# Patient Record
Sex: FEMALE | Race: OTHER | HISPANIC OR LATINO
[De-identification: names, ages, dates, MRNs, and addresses within clinical notes are randomized per-mention and may not be internally consistent; named-entity substitution may affect disease eponyms.]

---

## 2017-02-02 ENCOUNTER — RESULT REVIEW (OUTPATIENT)
Age: 22
End: 2017-02-02

## 2017-04-05 ENCOUNTER — OUTPATIENT (OUTPATIENT)
Dept: OUTPATIENT SERVICES | Facility: HOSPITAL | Age: 22
LOS: 1 days | Discharge: HOME | End: 2017-04-05
Payer: SELF-PAY

## 2017-04-05 DIAGNOSIS — R76.11 NONSPECIFIC REACTION TO TUBERCULIN SKIN TEST WITHOUT ACTIVE TUBERCULOSIS: ICD-10-CM

## 2017-04-05 PROCEDURE — 71020: CPT | Mod: 26

## 2017-07-12 ENCOUNTER — RESULT REVIEW (OUTPATIENT)
Age: 22
End: 2017-07-12

## 2018-02-20 ENCOUNTER — EMERGENCY (EMERGENCY)
Facility: HOSPITAL | Age: 23
LOS: 0 days | Discharge: HOME | End: 2018-02-20
Attending: EMERGENCY MEDICINE | Admitting: EMERGENCY MEDICINE
Payer: SELF-PAY

## 2018-02-20 VITALS
DIASTOLIC BLOOD PRESSURE: 75 MMHG | RESPIRATION RATE: 16 BRPM | HEART RATE: 66 BPM | OXYGEN SATURATION: 100 % | TEMPERATURE: 98 F | SYSTOLIC BLOOD PRESSURE: 120 MMHG

## 2018-02-20 VITALS
HEART RATE: 66 BPM | TEMPERATURE: 98 F | OXYGEN SATURATION: 100 % | RESPIRATION RATE: 16 BRPM | SYSTOLIC BLOOD PRESSURE: 118 MMHG | DIASTOLIC BLOOD PRESSURE: 71 MMHG

## 2018-02-20 DIAGNOSIS — R51 HEADACHE: ICD-10-CM

## 2018-02-20 DIAGNOSIS — G93.89 OTHER SPECIFIED DISORDERS OF BRAIN: ICD-10-CM

## 2018-02-20 DIAGNOSIS — R11.0 NAUSEA: ICD-10-CM

## 2018-02-20 DIAGNOSIS — R10.13 EPIGASTRIC PAIN: ICD-10-CM

## 2018-02-20 LAB
ALBUMIN SERPL ELPH-MCNC: 4.1 G/DL — SIGNIFICANT CHANGE UP (ref 3.3–5)
ALP SERPL-CCNC: 90 U/L — SIGNIFICANT CHANGE UP (ref 40–120)
ALT FLD-CCNC: 19 U/L — SIGNIFICANT CHANGE UP (ref 12–78)
ANION GAP SERPL CALC-SCNC: 6 MMOL/L — SIGNIFICANT CHANGE UP (ref 5–17)
APPEARANCE UR: CLEAR — SIGNIFICANT CHANGE UP
AST SERPL-CCNC: 14 U/L — LOW (ref 15–37)
BACTERIA # UR AUTO: (no result)
BASOPHILS # BLD AUTO: 0.1 K/UL — SIGNIFICANT CHANGE UP (ref 0–0.2)
BASOPHILS NFR BLD AUTO: 0.7 % — SIGNIFICANT CHANGE UP (ref 0–2)
BILIRUB SERPL-MCNC: 0.5 MG/DL — SIGNIFICANT CHANGE UP (ref 0.2–1.2)
BILIRUB UR-MCNC: NEGATIVE — SIGNIFICANT CHANGE UP
BUN SERPL-MCNC: 6 MG/DL — LOW (ref 7–23)
CALCIUM SERPL-MCNC: 8.8 MG/DL — SIGNIFICANT CHANGE UP (ref 8.5–10.1)
CHLORIDE SERPL-SCNC: 110 MMOL/L — HIGH (ref 96–108)
CO2 SERPL-SCNC: 25 MMOL/L — SIGNIFICANT CHANGE UP (ref 22–31)
COLOR SPEC: YELLOW — SIGNIFICANT CHANGE UP
CREAT SERPL-MCNC: 0.7 MG/DL — SIGNIFICANT CHANGE UP (ref 0.5–1.3)
DIFF PNL FLD: (no result)
EOSINOPHIL # BLD AUTO: 0.1 K/UL — SIGNIFICANT CHANGE UP (ref 0–0.5)
EOSINOPHIL NFR BLD AUTO: 0.7 % — SIGNIFICANT CHANGE UP (ref 0–6)
EPI CELLS # UR: (no result)
GLUCOSE SERPL-MCNC: 80 MG/DL — SIGNIFICANT CHANGE UP (ref 70–99)
GLUCOSE UR QL: NEGATIVE MG/DL — SIGNIFICANT CHANGE UP
HCG SERPL-ACNC: <1 MIU/ML — SIGNIFICANT CHANGE UP
HCT VFR BLD CALC: 40.7 % — SIGNIFICANT CHANGE UP (ref 34.5–45)
HGB BLD-MCNC: 14.3 G/DL — SIGNIFICANT CHANGE UP (ref 11.5–15.5)
KETONES UR-MCNC: NEGATIVE — SIGNIFICANT CHANGE UP
LEUKOCYTE ESTERASE UR-ACNC: NEGATIVE — SIGNIFICANT CHANGE UP
LYMPHOCYTES # BLD AUTO: 2.8 K/UL — SIGNIFICANT CHANGE UP (ref 1–3.3)
LYMPHOCYTES # BLD AUTO: 23.4 % — SIGNIFICANT CHANGE UP (ref 13–44)
MCHC RBC-ENTMCNC: 29.9 PG — SIGNIFICANT CHANGE UP (ref 27–34)
MCHC RBC-ENTMCNC: 35.2 GM/DL — SIGNIFICANT CHANGE UP (ref 32–36)
MCV RBC AUTO: 85 FL — SIGNIFICANT CHANGE UP (ref 80–100)
MONOCYTES # BLD AUTO: 0.7 K/UL — SIGNIFICANT CHANGE UP (ref 0–0.9)
MONOCYTES NFR BLD AUTO: 5.9 % — SIGNIFICANT CHANGE UP (ref 2–14)
NEUTROPHILS # BLD AUTO: 8.3 K/UL — HIGH (ref 1.8–7.4)
NEUTROPHILS NFR BLD AUTO: 69.2 % — SIGNIFICANT CHANGE UP (ref 43–77)
NITRITE UR-MCNC: NEGATIVE — SIGNIFICANT CHANGE UP
PH UR: 6.5 — SIGNIFICANT CHANGE UP (ref 5–8)
PLATELET # BLD AUTO: 260 K/UL — SIGNIFICANT CHANGE UP (ref 150–400)
POTASSIUM SERPL-MCNC: 3.6 MMOL/L — SIGNIFICANT CHANGE UP (ref 3.5–5.3)
POTASSIUM SERPL-SCNC: 3.6 MMOL/L — SIGNIFICANT CHANGE UP (ref 3.5–5.3)
PROT SERPL-MCNC: 7.4 GM/DL — SIGNIFICANT CHANGE UP (ref 6–8.3)
PROT UR-MCNC: NEGATIVE MG/DL — SIGNIFICANT CHANGE UP
RBC # BLD: 4.78 M/UL — SIGNIFICANT CHANGE UP (ref 3.8–5.2)
RBC # FLD: 10.7 % — SIGNIFICANT CHANGE UP (ref 10.3–14.5)
RBC CASTS # UR COMP ASSIST: (no result) /HPF (ref 0–4)
SODIUM SERPL-SCNC: 141 MMOL/L — SIGNIFICANT CHANGE UP (ref 135–145)
SP GR SPEC: 1 — LOW (ref 1.01–1.02)
UROBILINOGEN FLD QL: NEGATIVE MG/DL — SIGNIFICANT CHANGE UP
WBC # BLD: 12 K/UL — HIGH (ref 3.8–10.5)
WBC # FLD AUTO: 12 K/UL — HIGH (ref 3.8–10.5)
WBC UR QL: SIGNIFICANT CHANGE UP

## 2018-02-20 PROCEDURE — 70450 CT HEAD/BRAIN W/O DYE: CPT | Mod: 26

## 2018-02-20 PROCEDURE — 99284 EMERGENCY DEPT VISIT MOD MDM: CPT

## 2018-02-20 RX ORDER — IBUPROFEN 200 MG
1 TABLET ORAL
Qty: 20 | Refills: 0
Start: 2018-02-20

## 2018-02-20 RX ORDER — ACETAMINOPHEN 500 MG
650 TABLET ORAL ONCE
Qty: 0 | Refills: 0 | Status: COMPLETED | OUTPATIENT
Start: 2018-02-20 | End: 2018-02-20

## 2018-02-20 RX ORDER — ACETAMINOPHEN 500 MG
3 TABLET ORAL
Qty: 40 | Refills: 0
Start: 2018-02-20

## 2018-02-20 RX ORDER — SODIUM CHLORIDE 9 MG/ML
1000 INJECTION INTRAMUSCULAR; INTRAVENOUS; SUBCUTANEOUS ONCE
Qty: 0 | Refills: 0 | Status: COMPLETED | OUTPATIENT
Start: 2018-02-20 | End: 2018-02-20

## 2018-02-20 RX ORDER — METOCLOPRAMIDE HCL 10 MG
10 TABLET ORAL ONCE
Qty: 0 | Refills: 0 | Status: COMPLETED | OUTPATIENT
Start: 2018-02-20 | End: 2018-02-20

## 2018-02-20 RX ADMIN — Medication 650 MILLIGRAM(S): at 15:53

## 2018-02-20 RX ADMIN — Medication 10 MILLIGRAM(S): at 15:53

## 2018-02-20 RX ADMIN — SODIUM CHLORIDE 1000 MILLILITER(S): 9 INJECTION INTRAMUSCULAR; INTRAVENOUS; SUBCUTANEOUS at 15:54

## 2018-02-20 NOTE — ED PROVIDER NOTE - MEDICAL DECISION MAKING DETAILS
23 y/o F c/o gradual onset HA, nausea, dizziness, eye pain over the past x2 days with plans to receive labs, CBC, CT imaging

## 2018-02-20 NOTE — ED PROVIDER NOTE - OBJECTIVE STATEMENT
21 y/o F with no pertinent PMHx presents to the ED c/o worsening HA, L sided neck pain, nausea for the past x2 days. Pt states that she has been experiencing blurry vision as well as epigastric pain. Pt currently calm, able to provide adequate hx and denies fever, cough, chills, vomiting, dir britt or any other acute c/o at this time. Pt states that the pain came on gradually, had x1 pregnancy in past, no sick contacts. Pt states that she went x2 months without period, took home test which was negative. Pt states that she has been experiencing bilat eye pain.

## 2018-02-20 NOTE — ED ADULT NURSE NOTE - CHIEF COMPLAINT QUOTE
frontal headache x 2 days denies fever, denies nausea or vomiting, blurry vision x 2 days, tingling in bilateral hands, last menstrual period 2 months ago, states pregnancy test was negative.  reports epigastric pain for 2 months,  did not take any medications for headache. denies h/o headaches

## 2018-02-20 NOTE — ED ADULT TRIAGE NOTE - CHIEF COMPLAINT QUOTE
frontal headache x 2 days denies fever, denies nausea or vomiting, blurry vision x 2 days, tingling in bilateral hands, last menstrual period 2 months ago, states pregnancy test was negative.  reports epigastric pain for 2 months frontal headache x 2 days denies fever, denies nausea or vomiting, blurry vision x 2 days, tingling in bilateral hands, last menstrual period 2 months ago, states pregnancy test was negative.  reports epigastric pain for 2 months,  did not take any medications for headache. denies h/o headaches

## 2018-02-21 LAB
CULTURE RESULTS: NO GROWTH — SIGNIFICANT CHANGE UP
SPECIMEN SOURCE: SIGNIFICANT CHANGE UP

## 2018-10-03 ENCOUNTER — RESULT REVIEW (OUTPATIENT)
Age: 23
End: 2018-10-03

## 2018-10-28 ENCOUNTER — EMERGENCY (EMERGENCY)
Facility: HOSPITAL | Age: 23
LOS: 0 days | Discharge: HOME | End: 2018-10-28
Attending: EMERGENCY MEDICINE | Admitting: EMERGENCY MEDICINE
Payer: MEDICAID

## 2018-10-28 VITALS
SYSTOLIC BLOOD PRESSURE: 109 MMHG | HEART RATE: 65 BPM | DIASTOLIC BLOOD PRESSURE: 64 MMHG | RESPIRATION RATE: 16 BRPM | TEMPERATURE: 99 F | OXYGEN SATURATION: 100 %

## 2018-10-28 VITALS — HEIGHT: 64.96 IN | WEIGHT: 125 LBS

## 2018-10-28 DIAGNOSIS — N39.0 URINARY TRACT INFECTION, SITE NOT SPECIFIED: ICD-10-CM

## 2018-10-28 DIAGNOSIS — R10.9 UNSPECIFIED ABDOMINAL PAIN: ICD-10-CM

## 2018-10-28 DIAGNOSIS — K59.00 CONSTIPATION, UNSPECIFIED: ICD-10-CM

## 2018-10-28 LAB
ALBUMIN SERPL ELPH-MCNC: 4.2 G/DL — SIGNIFICANT CHANGE UP (ref 3.3–5)
ALP SERPL-CCNC: 85 U/L — SIGNIFICANT CHANGE UP (ref 40–120)
ALT FLD-CCNC: 21 U/L — SIGNIFICANT CHANGE UP (ref 12–78)
ANION GAP SERPL CALC-SCNC: 6 MMOL/L — SIGNIFICANT CHANGE UP (ref 5–17)
APPEARANCE UR: ABNORMAL
AST SERPL-CCNC: 14 U/L — LOW (ref 15–37)
BACTERIA # UR AUTO: ABNORMAL
BASOPHILS # BLD AUTO: 0.03 K/UL — SIGNIFICANT CHANGE UP (ref 0–0.2)
BASOPHILS NFR BLD AUTO: 0.3 % — SIGNIFICANT CHANGE UP (ref 0–2)
BILIRUB SERPL-MCNC: 0.4 MG/DL — SIGNIFICANT CHANGE UP (ref 0.2–1.2)
BILIRUB UR-MCNC: NEGATIVE — SIGNIFICANT CHANGE UP
BUN SERPL-MCNC: 6 MG/DL — LOW (ref 7–23)
CALCIUM SERPL-MCNC: 9.2 MG/DL — SIGNIFICANT CHANGE UP (ref 8.5–10.1)
CHLORIDE SERPL-SCNC: 108 MMOL/L — SIGNIFICANT CHANGE UP (ref 96–108)
CO2 SERPL-SCNC: 28 MMOL/L — SIGNIFICANT CHANGE UP (ref 22–31)
COLOR SPEC: YELLOW — SIGNIFICANT CHANGE UP
CREAT SERPL-MCNC: 0.71 MG/DL — SIGNIFICANT CHANGE UP (ref 0.5–1.3)
DIFF PNL FLD: ABNORMAL
EOSINOPHIL # BLD AUTO: 0.09 K/UL — SIGNIFICANT CHANGE UP (ref 0–0.5)
EOSINOPHIL NFR BLD AUTO: 0.8 % — SIGNIFICANT CHANGE UP (ref 0–6)
EPI CELLS # UR: SIGNIFICANT CHANGE UP
GLUCOSE SERPL-MCNC: 88 MG/DL — SIGNIFICANT CHANGE UP (ref 70–99)
GLUCOSE UR QL: NEGATIVE MG/DL — SIGNIFICANT CHANGE UP
HCT VFR BLD CALC: 41.4 % — SIGNIFICANT CHANGE UP (ref 34.5–45)
HGB BLD-MCNC: 14.5 G/DL — SIGNIFICANT CHANGE UP (ref 11.5–15.5)
IMM GRANULOCYTES NFR BLD AUTO: 0.3 % — SIGNIFICANT CHANGE UP (ref 0–1.5)
INR BLD: 1.19 RATIO — HIGH (ref 0.88–1.16)
KETONES UR-MCNC: NEGATIVE — SIGNIFICANT CHANGE UP
LEUKOCYTE ESTERASE UR-ACNC: ABNORMAL
LYMPHOCYTES # BLD AUTO: 3.59 K/UL — HIGH (ref 1–3.3)
LYMPHOCYTES # BLD AUTO: 33.8 % — SIGNIFICANT CHANGE UP (ref 13–44)
MCHC RBC-ENTMCNC: 30.1 PG — SIGNIFICANT CHANGE UP (ref 27–34)
MCHC RBC-ENTMCNC: 35 GM/DL — SIGNIFICANT CHANGE UP (ref 32–36)
MCV RBC AUTO: 85.9 FL — SIGNIFICANT CHANGE UP (ref 80–100)
MONOCYTES # BLD AUTO: 0.51 K/UL — SIGNIFICANT CHANGE UP (ref 0–0.9)
MONOCYTES NFR BLD AUTO: 4.8 % — SIGNIFICANT CHANGE UP (ref 2–14)
NEUTROPHILS # BLD AUTO: 6.36 K/UL — SIGNIFICANT CHANGE UP (ref 1.8–7.4)
NEUTROPHILS NFR BLD AUTO: 60 % — SIGNIFICANT CHANGE UP (ref 43–77)
NITRITE UR-MCNC: NEGATIVE — SIGNIFICANT CHANGE UP
NRBC # BLD: 0 /100 WBCS — SIGNIFICANT CHANGE UP (ref 0–0)
PH UR: 6 — SIGNIFICANT CHANGE UP (ref 5–8)
PLATELET # BLD AUTO: 351 K/UL — SIGNIFICANT CHANGE UP (ref 150–400)
POTASSIUM SERPL-MCNC: 3.6 MMOL/L — SIGNIFICANT CHANGE UP (ref 3.5–5.3)
POTASSIUM SERPL-SCNC: 3.6 MMOL/L — SIGNIFICANT CHANGE UP (ref 3.5–5.3)
PROT SERPL-MCNC: 7.8 GM/DL — SIGNIFICANT CHANGE UP (ref 6–8.3)
PROT UR-MCNC: 15 MG/DL
PROTHROM AB SERPL-ACNC: 12.9 SEC — HIGH (ref 9.8–12.7)
RBC # BLD: 4.82 M/UL — SIGNIFICANT CHANGE UP (ref 3.8–5.2)
RBC # FLD: 11.6 % — SIGNIFICANT CHANGE UP (ref 10.3–14.5)
RBC CASTS # UR COMP ASSIST: ABNORMAL /HPF (ref 0–4)
SODIUM SERPL-SCNC: 142 MMOL/L — SIGNIFICANT CHANGE UP (ref 135–145)
SP GR SPEC: 1.01 — SIGNIFICANT CHANGE UP (ref 1.01–1.02)
UROBILINOGEN FLD QL: NEGATIVE MG/DL — SIGNIFICANT CHANGE UP
WBC # BLD: 10.61 K/UL — HIGH (ref 3.8–10.5)
WBC # FLD AUTO: 10.61 K/UL — HIGH (ref 3.8–10.5)
WBC UR QL: ABNORMAL

## 2018-10-28 PROCEDURE — 99284 EMERGENCY DEPT VISIT MOD MDM: CPT

## 2018-10-28 PROCEDURE — 74177 CT ABD & PELVIS W/CONTRAST: CPT | Mod: 26

## 2018-10-28 RX ORDER — CEPHALEXIN 500 MG
1 CAPSULE ORAL
Qty: 5 | Refills: 0
Start: 2018-10-28 | End: 2018-11-01

## 2018-10-28 RX ORDER — ACETAMINOPHEN 500 MG
1000 TABLET ORAL ONCE
Qty: 0 | Refills: 0 | Status: COMPLETED | OUTPATIENT
Start: 2018-10-28 | End: 2018-10-28

## 2018-10-28 RX ORDER — CEFTRIAXONE 500 MG/1
1000 INJECTION, POWDER, FOR SOLUTION INTRAMUSCULAR; INTRAVENOUS ONCE
Qty: 0 | Refills: 0 | Status: COMPLETED | OUTPATIENT
Start: 2018-10-28 | End: 2018-10-28

## 2018-10-28 RX ORDER — DIPHENHYDRAMINE HCL 50 MG
25 CAPSULE ORAL ONCE
Qty: 0 | Refills: 0 | Status: COMPLETED | OUTPATIENT
Start: 2018-10-28 | End: 2018-10-28

## 2018-10-28 RX ADMIN — Medication 400 MILLIGRAM(S): at 20:33

## 2018-10-28 RX ADMIN — Medication 25 MILLIGRAM(S): at 21:58

## 2018-10-28 RX ADMIN — CEFTRIAXONE 1000 MILLIGRAM(S): 500 INJECTION, POWDER, FOR SOLUTION INTRAMUSCULAR; INTRAVENOUS at 22:38

## 2018-10-28 NOTE — ED ADULT TRIAGE NOTE - CHIEF COMPLAINT QUOTE
c/o generalized abdominal pain started 3 days ago, pt c/o constipation, has not taken medication for symptoms, denies nausea/vomiting/diarrhea

## 2018-10-28 NOTE — ED STATDOCS - MEDICAL DECISION MAKING DETAILS
Pt with UTI.  Given rocephin, IVF in ED.  Appears well on reexam.  D/c home with antibiotics.  F/u with PCP in 1 week.

## 2018-10-28 NOTE — ED STATDOCS - OBJECTIVE STATEMENT
22 y/o f presenting to the ED c/o constipation, abd pain x3 days. Denies n/v/d, fever, chills. No medications taken PTA. Pain worse with eating. Last menstrual period 9/28/18. PCP: Misbah Jett.

## 2018-10-28 NOTE — ED STATDOCS - PROGRESS NOTE DETAILS
Patient is feeling much better, tests/labs reviewed. case discussed with attending. OK to dc home. STEPHEN Davis

## 2018-10-28 NOTE — ED ADULT NURSE NOTE - OBJECTIVE STATEMENT
Patient presents to ED for abdominal pain since 3days ago. Patient denies any urinary symptoms. States BM are irregular.

## 2018-10-28 NOTE — ED STATDOCS - PHYSICAL EXAMINATION
GEN: AOX3, NAD. HEENT: Throat clear. Head NC/AT. NECK: Supple, No JVD. FROM. C-spine non-tender. CV:S1S2, RRR, LUNGS: CTA b/l, no w/r/r. ABD: Soft, NT/ND, no rebound, no guarding. No CVAT. EXT: No e/c/c. 2+ distal pulses. SKIN: No rashes. NEURO: No focal deficits. CN II-XII intact. FROM. 5/5 motor and sensory. STEPHEN Davis

## 2019-01-02 ENCOUNTER — EMERGENCY (EMERGENCY)
Facility: HOSPITAL | Age: 24
LOS: 1 days | Discharge: HOME | End: 2019-01-02
Attending: EMERGENCY MEDICINE | Admitting: EMERGENCY MEDICINE
Payer: MEDICAID

## 2019-01-02 VITALS
RESPIRATION RATE: 19 BRPM | OXYGEN SATURATION: 100 % | TEMPERATURE: 98 F | SYSTOLIC BLOOD PRESSURE: 111 MMHG | HEART RATE: 73 BPM | DIASTOLIC BLOOD PRESSURE: 72 MMHG

## 2019-01-02 VITALS — WEIGHT: 139.99 LBS | HEIGHT: 65 IN

## 2019-01-02 DIAGNOSIS — R33.0 DRUG INDUCED RETENTION OF URINE: ICD-10-CM

## 2019-01-02 DIAGNOSIS — R51 HEADACHE: ICD-10-CM

## 2019-01-02 DIAGNOSIS — M54.9 DORSALGIA, UNSPECIFIED: ICD-10-CM

## 2019-01-02 DIAGNOSIS — M54.5 LOW BACK PAIN: ICD-10-CM

## 2019-01-02 LAB
APPEARANCE UR: CLEAR — SIGNIFICANT CHANGE UP
BACTERIA # UR AUTO: ABNORMAL
BILIRUB UR-MCNC: NEGATIVE — SIGNIFICANT CHANGE UP
COLOR SPEC: YELLOW — SIGNIFICANT CHANGE UP
DIFF PNL FLD: ABNORMAL
EPI CELLS # UR: SIGNIFICANT CHANGE UP
GLUCOSE UR QL: NEGATIVE MG/DL — SIGNIFICANT CHANGE UP
KETONES UR-MCNC: ABNORMAL
LEUKOCYTE ESTERASE UR-ACNC: NEGATIVE — SIGNIFICANT CHANGE UP
NITRITE UR-MCNC: NEGATIVE — SIGNIFICANT CHANGE UP
PH UR: 8 — SIGNIFICANT CHANGE UP (ref 5–8)
PROT UR-MCNC: NEGATIVE MG/DL — SIGNIFICANT CHANGE UP
RBC CASTS # UR COMP ASSIST: ABNORMAL /HPF (ref 0–4)
SP GR SPEC: 1.01 — SIGNIFICANT CHANGE UP (ref 1.01–1.02)
UROBILINOGEN FLD QL: NEGATIVE MG/DL — SIGNIFICANT CHANGE UP
WBC UR QL: NEGATIVE — SIGNIFICANT CHANGE UP

## 2019-01-02 PROCEDURE — 99283 EMERGENCY DEPT VISIT LOW MDM: CPT

## 2019-01-02 RX ORDER — KETOROLAC TROMETHAMINE 30 MG/ML
30 SYRINGE (ML) INJECTION ONCE
Qty: 0 | Refills: 0 | Status: DISCONTINUED | OUTPATIENT
Start: 2019-01-02 | End: 2019-01-02

## 2019-01-02 RX ADMIN — Medication 30 MILLIGRAM(S): at 22:19

## 2019-01-02 NOTE — ED STATDOCS - ATTENDING CONTRIBUTION TO CARE
Attending Contribution to Care: I, Stephanie Villarreal, performed the initial face to face bedside interview with this patient regarding history of present illness, review of symptoms and relevant past medical, social and family history.  I completed an independent physical examination.  I was the initial provider who evaluated this patient and the history, physical, and MDM reflect this intial assessment. I have signed out the follow up of any pending tests after the original (i.e. labs, radiological studies) to the ACP with instructions to review any with instructions to review any concerning findings to me prior to discharge.  I have communicated the patient’s plan of care and disposition with the ACP.

## 2019-01-02 NOTE — ED STATDOCS - PROGRESS NOTE DETAILS
pr c/o right lower back pain works lifting heavy item's  denies distal sensory loss Pt feeling better after medication, plan to d/c home with outpt f/u with PMD, pt agreeable to d/c and plan of care, all questions answered, return precautions given  Avis Tamayo PA-C

## 2019-01-02 NOTE — ED STATDOCS - CARE PLAN
Principal Discharge DX:	Acute bilateral low back pain without sciatica  Goal:	evaluate and discharge

## 2019-01-02 NOTE — ED ADULT NURSE NOTE - NSIMPLEMENTINTERV_GEN_ALL_ED
Implemented All Universal Safety Interventions:  [place] to call system. Call bell, personal items and telephone within reach. Instruct patient to call for assistance. Room bathroom lighting operational. Non-slip footwear when patient is off stretcher. Physically safe environment: no spills, clutter or unnecessary equipment. Stretcher in lowest position, wheels locked, appropriate side rails in place.

## 2019-01-02 NOTE — ED ADULT NURSE REASSESSMENT NOTE - NS ED NURSE REASSESS COMMENT FT1
patient discharged home. written and verbal discharge and followup instructions given to patient, patient verbalized back understanding. discharged home with family at 2316.

## 2019-01-02 NOTE — ED STATDOCS - OBJECTIVE STATEMENT
24 y/o female with no pertinent PMHx presenting to the ED c/o headache and back pain x2 weeks. +pain with urination. Taking Ibuprofen with no relief. LNMP 11/13.

## 2019-01-02 NOTE — ED ADULT TRIAGE NOTE - CHIEF COMPLAINT QUOTE
Pt presents to ER c/o back pain and headache. Onset of symptoms began 2 weeks ago. Pt states "I would like to have a pregnancy test". Neuro intact, gait stable

## 2019-01-08 ENCOUNTER — EMERGENCY (EMERGENCY)
Facility: HOSPITAL | Age: 24
LOS: 0 days | Discharge: HOME | End: 2019-01-09
Attending: EMERGENCY MEDICINE | Admitting: EMERGENCY MEDICINE
Payer: MEDICAID

## 2019-01-08 VITALS — WEIGHT: 145.06 LBS

## 2019-01-08 DIAGNOSIS — Z79.899 OTHER LONG TERM (CURRENT) DRUG THERAPY: ICD-10-CM

## 2019-01-08 DIAGNOSIS — Z98.890 OTHER SPECIFIED POSTPROCEDURAL STATES: ICD-10-CM

## 2019-01-08 DIAGNOSIS — N64.4 MASTODYNIA: ICD-10-CM

## 2019-01-08 DIAGNOSIS — K52.9 NONINFECTIVE GASTROENTERITIS AND COLITIS, UNSPECIFIED: ICD-10-CM

## 2019-01-08 DIAGNOSIS — R11.0 NAUSEA: ICD-10-CM

## 2019-01-08 DIAGNOSIS — R10.30 LOWER ABDOMINAL PAIN, UNSPECIFIED: ICD-10-CM

## 2019-01-08 LAB
ALBUMIN SERPL ELPH-MCNC: 3.9 G/DL — SIGNIFICANT CHANGE UP (ref 3.3–5)
ALP SERPL-CCNC: 87 U/L — SIGNIFICANT CHANGE UP (ref 40–120)
ALT FLD-CCNC: 24 U/L — SIGNIFICANT CHANGE UP (ref 12–78)
ANION GAP SERPL CALC-SCNC: 8 MMOL/L — SIGNIFICANT CHANGE UP (ref 5–17)
APPEARANCE UR: CLEAR — SIGNIFICANT CHANGE UP
AST SERPL-CCNC: 21 U/L — SIGNIFICANT CHANGE UP (ref 15–37)
BASOPHILS # BLD AUTO: 0.03 K/UL — SIGNIFICANT CHANGE UP (ref 0–0.2)
BASOPHILS NFR BLD AUTO: 0.3 % — SIGNIFICANT CHANGE UP (ref 0–2)
BILIRUB SERPL-MCNC: 0.3 MG/DL — SIGNIFICANT CHANGE UP (ref 0.2–1.2)
BILIRUB UR-MCNC: NEGATIVE — SIGNIFICANT CHANGE UP
BUN SERPL-MCNC: 7 MG/DL — SIGNIFICANT CHANGE UP (ref 7–23)
CALCIUM SERPL-MCNC: 9.1 MG/DL — SIGNIFICANT CHANGE UP (ref 8.5–10.1)
CHLORIDE SERPL-SCNC: 108 MMOL/L — SIGNIFICANT CHANGE UP (ref 96–108)
CO2 SERPL-SCNC: 25 MMOL/L — SIGNIFICANT CHANGE UP (ref 22–31)
COLOR SPEC: YELLOW — SIGNIFICANT CHANGE UP
CREAT SERPL-MCNC: 0.69 MG/DL — SIGNIFICANT CHANGE UP (ref 0.5–1.3)
DIFF PNL FLD: ABNORMAL
EOSINOPHIL # BLD AUTO: 0.28 K/UL — SIGNIFICANT CHANGE UP (ref 0–0.5)
EOSINOPHIL NFR BLD AUTO: 3.1 % — SIGNIFICANT CHANGE UP (ref 0–6)
GLUCOSE SERPL-MCNC: 90 MG/DL — SIGNIFICANT CHANGE UP (ref 70–99)
GLUCOSE UR QL: NEGATIVE MG/DL — SIGNIFICANT CHANGE UP
HCT VFR BLD CALC: 39.8 % — SIGNIFICANT CHANGE UP (ref 34.5–45)
HGB BLD-MCNC: 13.6 G/DL — SIGNIFICANT CHANGE UP (ref 11.5–15.5)
IMM GRANULOCYTES NFR BLD AUTO: 0.2 % — SIGNIFICANT CHANGE UP (ref 0–1.5)
KETONES UR-MCNC: NEGATIVE — SIGNIFICANT CHANGE UP
LEUKOCYTE ESTERASE UR-ACNC: NEGATIVE — SIGNIFICANT CHANGE UP
LIDOCAIN IGE QN: 145 U/L — SIGNIFICANT CHANGE UP (ref 73–393)
LYMPHOCYTES # BLD AUTO: 3.78 K/UL — HIGH (ref 1–3.3)
LYMPHOCYTES # BLD AUTO: 41.2 % — SIGNIFICANT CHANGE UP (ref 13–44)
MCHC RBC-ENTMCNC: 29.8 PG — SIGNIFICANT CHANGE UP (ref 27–34)
MCHC RBC-ENTMCNC: 34.2 GM/DL — SIGNIFICANT CHANGE UP (ref 32–36)
MCV RBC AUTO: 87.3 FL — SIGNIFICANT CHANGE UP (ref 80–100)
MONOCYTES # BLD AUTO: 0.66 K/UL — SIGNIFICANT CHANGE UP (ref 0–0.9)
MONOCYTES NFR BLD AUTO: 7.2 % — SIGNIFICANT CHANGE UP (ref 2–14)
NEUTROPHILS # BLD AUTO: 4.41 K/UL — SIGNIFICANT CHANGE UP (ref 1.8–7.4)
NEUTROPHILS NFR BLD AUTO: 48 % — SIGNIFICANT CHANGE UP (ref 43–77)
NITRITE UR-MCNC: NEGATIVE — SIGNIFICANT CHANGE UP
NRBC # BLD: 0 /100 WBCS — SIGNIFICANT CHANGE UP (ref 0–0)
PH UR: 6 — SIGNIFICANT CHANGE UP (ref 5–8)
PLATELET # BLD AUTO: 290 K/UL — SIGNIFICANT CHANGE UP (ref 150–400)
POTASSIUM SERPL-MCNC: 3.5 MMOL/L — SIGNIFICANT CHANGE UP (ref 3.5–5.3)
POTASSIUM SERPL-SCNC: 3.5 MMOL/L — SIGNIFICANT CHANGE UP (ref 3.5–5.3)
PROT SERPL-MCNC: 7.3 GM/DL — SIGNIFICANT CHANGE UP (ref 6–8.3)
PROT UR-MCNC: NEGATIVE MG/DL — SIGNIFICANT CHANGE UP
RBC # BLD: 4.56 M/UL — SIGNIFICANT CHANGE UP (ref 3.8–5.2)
RBC # FLD: 12.3 % — SIGNIFICANT CHANGE UP (ref 10.3–14.5)
RBC CASTS # UR COMP ASSIST: SIGNIFICANT CHANGE UP /HPF (ref 0–4)
SODIUM SERPL-SCNC: 141 MMOL/L — SIGNIFICANT CHANGE UP (ref 135–145)
SP GR SPEC: 1.01 — SIGNIFICANT CHANGE UP (ref 1.01–1.02)
UROBILINOGEN FLD QL: NEGATIVE MG/DL — SIGNIFICANT CHANGE UP
WBC # BLD: 9.18 K/UL — SIGNIFICANT CHANGE UP (ref 3.8–10.5)
WBC # FLD AUTO: 9.18 K/UL — SIGNIFICANT CHANGE UP (ref 3.8–10.5)
WBC UR QL: SIGNIFICANT CHANGE UP

## 2019-01-08 PROCEDURE — 99284 EMERGENCY DEPT VISIT MOD MDM: CPT

## 2019-01-08 PROCEDURE — 76856 US EXAM PELVIC COMPLETE: CPT | Mod: 26

## 2019-01-08 PROCEDURE — 74177 CT ABD & PELVIS W/CONTRAST: CPT | Mod: 26

## 2019-01-08 PROCEDURE — 76830 TRANSVAGINAL US NON-OB: CPT | Mod: 26

## 2019-01-08 RX ORDER — SODIUM CHLORIDE 9 MG/ML
1000 INJECTION INTRAMUSCULAR; INTRAVENOUS; SUBCUTANEOUS ONCE
Qty: 0 | Refills: 0 | Status: COMPLETED | OUTPATIENT
Start: 2019-01-08 | End: 2019-01-08

## 2019-01-08 RX ORDER — ONDANSETRON 8 MG/1
4 TABLET, FILM COATED ORAL ONCE
Qty: 0 | Refills: 0 | Status: COMPLETED | OUTPATIENT
Start: 2019-01-08 | End: 2019-01-08

## 2019-01-08 RX ORDER — MORPHINE SULFATE 50 MG/1
4 CAPSULE, EXTENDED RELEASE ORAL ONCE
Qty: 0 | Refills: 0 | Status: DISCONTINUED | OUTPATIENT
Start: 2019-01-08 | End: 2019-01-08

## 2019-01-08 RX ADMIN — MORPHINE SULFATE 4 MILLIGRAM(S): 50 CAPSULE, EXTENDED RELEASE ORAL at 19:22

## 2019-01-08 RX ADMIN — MORPHINE SULFATE 4 MILLIGRAM(S): 50 CAPSULE, EXTENDED RELEASE ORAL at 23:09

## 2019-01-08 RX ADMIN — SODIUM CHLORIDE 2000 MILLILITER(S): 9 INJECTION INTRAMUSCULAR; INTRAVENOUS; SUBCUTANEOUS at 19:22

## 2019-01-08 RX ADMIN — SODIUM CHLORIDE 1000 MILLILITER(S): 9 INJECTION INTRAMUSCULAR; INTRAVENOUS; SUBCUTANEOUS at 20:09

## 2019-01-08 RX ADMIN — Medication 1 TABLET(S): at 21:41

## 2019-01-08 RX ADMIN — ONDANSETRON 4 MILLIGRAM(S): 8 TABLET, FILM COATED ORAL at 19:22

## 2019-01-08 NOTE — ED STATDOCS - ATTENDING CONTRIBUTION TO CARE
I, Tj Keith MD,  performed the initial face to face bedside interview with this patient regarding history of present illness, review of symptoms and relevant past medical, social and family history.  I completed an independent physical examination.  I was the initial provider who evaluated this patient. I have signed out the follow up of any pending tests (i.e. labs, radiological studies) to the ACP.  I have communicated the patient’s plan of care and disposition with the ACP.  The history, relevant review of systems, past medical and surgical history, medical decision making, and physical examination was documented by the scribe in my presence and I attest to the accuracy of the documentation.

## 2019-01-08 NOTE — ED STATDOCS - OBJECTIVE STATEMENT
24 y/o female with a PMHx of s/p  presents to the ED c/o constant lower mid abd pain today. Pain is worse with walking, 8 out of 10 in severity. +nausea. No vomiting, diarrhea. Pt seen at a clinic and advised to go to the ED for US. Pt also notes breast pain x2 weeks. No + sick contact. LMP: in November, in December she bled a little bit, denies chance of pregnancy. [place]  used, ID#: [id number].

## 2019-01-08 NOTE — ED ADULT NURSE NOTE - NSIMPLEMENTINTERV_GEN_ALL_ED
Implemented All Universal Safety Interventions:  [place] to call system. Call bell, personal items and telephone within reach. Instruct patient to call for assistance. Room bathroom lighting operational. Non-slip footwear when patient is off stretcher. Physically safe environment: no spills, clutter or unnecessary equipment. Stretcher in lowest position, wheels locked, appropriate side rails in place.

## 2019-01-08 NOTE — ED ADULT TRIAGE NOTE - CHIEF COMPLAINT QUOTE
pt c/o abdominal pain   and breast pain with 1 day of vaginal bleeding. abdominal pain started o yesterday, no fever, nvd

## 2019-01-08 NOTE — ED STATDOCS - PROGRESS NOTE DETAILS
22 y/o female with a PMHx of s/p  presents to the ED c/o constant lower mid abd pain today. Pain is worse with walking, 8 out of 10 in severity. +nausea. No vomiting, diarrhea. Pt seen at a clinic and advised to go to the ED for US. Pt also notes breast pain x2 weeks. No + sick contact. LMP: in November, in December she bled a little bit, denies chance of pregnancy. [place]  used, ID#: [id number]. Patient seen and evaluated, ED attending note and orders reviewed, will continue with patient follow up and care. Will perform pelvic exam / breast exam. If no palpable masses or pain will order CT if  pain present will order US.   -Toan LORD, PA-C +palpable masses BL Breasts, outlined with surgical pen. + left adnexal tenderness. No palpable masses. US of BL breasts, Pelvic US ordered.   -Toan LORD PA-C    -Toan LORD, PA-C JG:  Received signout on this patient who was having breast pain and lower abd pain.  UCG negative.  Pelvic US negative.  CT showing jejunitis.  PA already sent augmentin to pharmacy for mastitis; outpatient US or mammogram advised.  Pt. also to f/u with GI for Jejunitis found on CT.  Augmentin will also be used to treat this as well.

## 2019-01-09 VITALS
OXYGEN SATURATION: 99 % | HEART RATE: 72 BPM | DIASTOLIC BLOOD PRESSURE: 70 MMHG | SYSTOLIC BLOOD PRESSURE: 110 MMHG | RESPIRATION RATE: 17 BRPM | TEMPERATURE: 98 F

## 2019-01-09 LAB
C TRACH RRNA SPEC QL NAA+PROBE: SIGNIFICANT CHANGE UP
N GONORRHOEA RRNA SPEC QL NAA+PROBE: SIGNIFICANT CHANGE UP
SPECIMEN SOURCE: SIGNIFICANT CHANGE UP

## 2019-01-10 DIAGNOSIS — Z98.891 HISTORY OF UTERINE SCAR FROM PREVIOUS SURGERY: Chronic | ICD-10-CM

## 2019-01-19 ENCOUNTER — EMERGENCY (EMERGENCY)
Facility: HOSPITAL | Age: 24
LOS: 0 days | Discharge: HOME | End: 2019-01-19
Attending: EMERGENCY MEDICINE | Admitting: EMERGENCY MEDICINE
Payer: MEDICAID

## 2019-01-19 VITALS
SYSTOLIC BLOOD PRESSURE: 109 MMHG | HEIGHT: 62.2 IN | TEMPERATURE: 99 F | RESPIRATION RATE: 16 BRPM | WEIGHT: 128.97 LBS | DIASTOLIC BLOOD PRESSURE: 61 MMHG | OXYGEN SATURATION: 100 % | HEART RATE: 76 BPM

## 2019-01-19 DIAGNOSIS — Z79.899 OTHER LONG TERM (CURRENT) DRUG THERAPY: ICD-10-CM

## 2019-01-19 DIAGNOSIS — R10.9 UNSPECIFIED ABDOMINAL PAIN: ICD-10-CM

## 2019-01-19 DIAGNOSIS — Z98.891 HISTORY OF UTERINE SCAR FROM PREVIOUS SURGERY: Chronic | ICD-10-CM

## 2019-01-19 DIAGNOSIS — N94.6 DYSMENORRHEA, UNSPECIFIED: ICD-10-CM

## 2019-01-19 LAB
ALBUMIN SERPL ELPH-MCNC: 3.8 G/DL — SIGNIFICANT CHANGE UP (ref 3.3–5)
ALP SERPL-CCNC: 87 U/L — SIGNIFICANT CHANGE UP (ref 40–120)
ALT FLD-CCNC: 21 U/L — SIGNIFICANT CHANGE UP (ref 12–78)
ANION GAP SERPL CALC-SCNC: 5 MMOL/L — SIGNIFICANT CHANGE UP (ref 5–17)
APPEARANCE UR: CLEAR — SIGNIFICANT CHANGE UP
APTT BLD: 32.7 SEC — SIGNIFICANT CHANGE UP (ref 27.5–36.3)
AST SERPL-CCNC: 17 U/L — SIGNIFICANT CHANGE UP (ref 15–37)
BACTERIA # UR AUTO: ABNORMAL
BASOPHILS # BLD AUTO: 0.02 K/UL — SIGNIFICANT CHANGE UP (ref 0–0.2)
BASOPHILS NFR BLD AUTO: 0.3 % — SIGNIFICANT CHANGE UP (ref 0–2)
BILIRUB SERPL-MCNC: 0.3 MG/DL — SIGNIFICANT CHANGE UP (ref 0.2–1.2)
BILIRUB UR-MCNC: NEGATIVE — SIGNIFICANT CHANGE UP
BLD GP AB SCN SERPL QL: SIGNIFICANT CHANGE UP
BUN SERPL-MCNC: 5 MG/DL — LOW (ref 7–23)
CALCIUM SERPL-MCNC: 8.7 MG/DL — SIGNIFICANT CHANGE UP (ref 8.5–10.1)
CHLORIDE SERPL-SCNC: 109 MMOL/L — HIGH (ref 96–108)
CO2 SERPL-SCNC: 27 MMOL/L — SIGNIFICANT CHANGE UP (ref 22–31)
COLOR SPEC: YELLOW — SIGNIFICANT CHANGE UP
CREAT SERPL-MCNC: 0.78 MG/DL — SIGNIFICANT CHANGE UP (ref 0.5–1.3)
DIFF PNL FLD: ABNORMAL
EOSINOPHIL # BLD AUTO: 0.19 K/UL — SIGNIFICANT CHANGE UP (ref 0–0.5)
EOSINOPHIL NFR BLD AUTO: 2.6 % — SIGNIFICANT CHANGE UP (ref 0–6)
EPI CELLS # UR: NEGATIVE — SIGNIFICANT CHANGE UP
GLUCOSE SERPL-MCNC: 79 MG/DL — SIGNIFICANT CHANGE UP (ref 70–99)
GLUCOSE UR QL: NEGATIVE MG/DL — SIGNIFICANT CHANGE UP
HCG SERPL-ACNC: <1 MIU/ML — SIGNIFICANT CHANGE UP
HCT VFR BLD CALC: 37.2 % — SIGNIFICANT CHANGE UP (ref 34.5–45)
HGB BLD-MCNC: 12.8 G/DL — SIGNIFICANT CHANGE UP (ref 11.5–15.5)
IMM GRANULOCYTES NFR BLD AUTO: 0.3 % — SIGNIFICANT CHANGE UP (ref 0–1.5)
INR BLD: 1.14 RATIO — SIGNIFICANT CHANGE UP (ref 0.88–1.16)
KETONES UR-MCNC: NEGATIVE — SIGNIFICANT CHANGE UP
LEUKOCYTE ESTERASE UR-ACNC: NEGATIVE — SIGNIFICANT CHANGE UP
LYMPHOCYTES # BLD AUTO: 2.94 K/UL — SIGNIFICANT CHANGE UP (ref 1–3.3)
LYMPHOCYTES # BLD AUTO: 39.8 % — SIGNIFICANT CHANGE UP (ref 13–44)
MCHC RBC-ENTMCNC: 30.8 PG — SIGNIFICANT CHANGE UP (ref 27–34)
MCHC RBC-ENTMCNC: 34.4 GM/DL — SIGNIFICANT CHANGE UP (ref 32–36)
MCV RBC AUTO: 89.6 FL — SIGNIFICANT CHANGE UP (ref 80–100)
MONOCYTES # BLD AUTO: 0.47 K/UL — SIGNIFICANT CHANGE UP (ref 0–0.9)
MONOCYTES NFR BLD AUTO: 6.4 % — SIGNIFICANT CHANGE UP (ref 2–14)
NEUTROPHILS # BLD AUTO: 3.75 K/UL — SIGNIFICANT CHANGE UP (ref 1.8–7.4)
NEUTROPHILS NFR BLD AUTO: 50.6 % — SIGNIFICANT CHANGE UP (ref 43–77)
NITRITE UR-MCNC: NEGATIVE — SIGNIFICANT CHANGE UP
NRBC # BLD: 0 /100 WBCS — SIGNIFICANT CHANGE UP (ref 0–0)
PH UR: 5 — SIGNIFICANT CHANGE UP (ref 5–8)
PLATELET # BLD AUTO: 287 K/UL — SIGNIFICANT CHANGE UP (ref 150–400)
POTASSIUM SERPL-MCNC: 3.8 MMOL/L — SIGNIFICANT CHANGE UP (ref 3.5–5.3)
POTASSIUM SERPL-SCNC: 3.8 MMOL/L — SIGNIFICANT CHANGE UP (ref 3.5–5.3)
PROT SERPL-MCNC: 7.2 GM/DL — SIGNIFICANT CHANGE UP (ref 6–8.3)
PROT UR-MCNC: NEGATIVE MG/DL — SIGNIFICANT CHANGE UP
PROTHROM AB SERPL-ACNC: 12.7 SEC — SIGNIFICANT CHANGE UP (ref 10–12.9)
RBC # BLD: 4.15 M/UL — SIGNIFICANT CHANGE UP (ref 3.8–5.2)
RBC # FLD: 12.3 % — SIGNIFICANT CHANGE UP (ref 10.3–14.5)
RBC CASTS # UR COMP ASSIST: ABNORMAL /HPF (ref 0–4)
SODIUM SERPL-SCNC: 141 MMOL/L — SIGNIFICANT CHANGE UP (ref 135–145)
SP GR SPEC: 1.01 — SIGNIFICANT CHANGE UP (ref 1.01–1.02)
TYPE + AB SCN PNL BLD: SIGNIFICANT CHANGE UP
UROBILINOGEN FLD QL: NEGATIVE MG/DL — SIGNIFICANT CHANGE UP
WBC # BLD: 7.39 K/UL — SIGNIFICANT CHANGE UP (ref 3.8–10.5)
WBC # FLD AUTO: 7.39 K/UL — SIGNIFICANT CHANGE UP (ref 3.8–10.5)
WBC UR QL: SIGNIFICANT CHANGE UP

## 2019-01-19 PROCEDURE — 76801 OB US < 14 WKS SINGLE FETUS: CPT | Mod: 26

## 2019-01-19 PROCEDURE — 99284 EMERGENCY DEPT VISIT MOD MDM: CPT

## 2019-01-19 PROCEDURE — 76856 US EXAM PELVIC COMPLETE: CPT | Mod: 26

## 2019-01-19 RX ORDER — SODIUM CHLORIDE 9 MG/ML
1000 INJECTION INTRAMUSCULAR; INTRAVENOUS; SUBCUTANEOUS ONCE
Qty: 0 | Refills: 0 | Status: COMPLETED | OUTPATIENT
Start: 2019-01-19 | End: 2019-01-19

## 2019-01-19 RX ORDER — ACETAMINOPHEN 500 MG
975 TABLET ORAL ONCE
Qty: 0 | Refills: 0 | Status: COMPLETED | OUTPATIENT
Start: 2019-01-19 | End: 2019-01-19

## 2019-01-19 RX ADMIN — SODIUM CHLORIDE 1000 MILLILITER(S): 9 INJECTION INTRAMUSCULAR; INTRAVENOUS; SUBCUTANEOUS at 17:44

## 2019-01-19 RX ADMIN — Medication 975 MILLIGRAM(S): at 17:43

## 2019-01-19 RX ADMIN — SODIUM CHLORIDE 1000 MILLILITER(S): 9 INJECTION INTRAMUSCULAR; INTRAVENOUS; SUBCUTANEOUS at 18:44

## 2019-01-19 RX ADMIN — Medication 975 MILLIGRAM(S): at 18:44

## 2019-01-19 NOTE — ED STATDOCS - OBJECTIVE STATEMENT
24 y/o female  AB0 with a PMHx of s/p  presents to the ED c/o abd pain and vaginal bleeding since yesterday. Pt has not had an US for this pregnancy and is not sure how many weeks pregnant she is. No illicit drug use. Nonsmoker. No EtOH consumption. Gynecologist: Dr. Malorie Moncada. Dr. [place]  used, ID#: [id number].

## 2019-01-19 NOTE — ED STATDOCS - PROGRESS NOTE DETAILS
Patient seen and evaluated, ED attending note and orders reviewed, will continue with patient follow up and care -Damon Crane PA-C 24 y/o female  AB0 with a PMHx of s/p  presents to the ED c/o abd pain and vaginal bleeding since yesterday. Pt reports lower abdominal cramping and vaginal bleeding that started yesterday. Pt is unsure if she is pregnant. LMP , periods are normally regular. Sees OB/Gyn at [facility]. Denies fever/chills, n/v/d, CP, SOB, urinary sx, or other complaints at this time. -Damon Crane PA-C Results reviewed and discussed with pt. Pt reports improvement of pain with tylenol. Rpt abd exam NTTP. Pt to take NSAIDs for pain. Discussed importance of close FU with her Ob/Gyn.  Pt asked to return to ED immediately for any new or concerning sx or worsening sx. Pt acknowledges and understands plan. -Damon Crane PA-C

## 2019-01-19 NOTE — ED ADULT TRIAGE NOTE - CHIEF COMPLAINT QUOTE
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

## 2019-01-19 NOTE — ED STATDOCS - NS_ ATTENDINGSCRIBEDETAILS _ED_A_ED_FT
The scribe's documentation has been prepared under my direction and personally reviewed by me in its entirety.  I confirm that the note above accurately reflects all my work, treatment, procedures, and decision making except where otherwise noted or amended by me.  Urbano Can M.D.

## 2019-01-19 NOTE — ED STATDOCS - ATTENDING CONTRIBUTION TO CARE
MADDIE Can MD,  performed the initial face to face bedside interview with this patient regarding history of present illness, review of symptoms and relevant past medical, social and family history.  I completed an independent physical examination.  I was the initial provider who evaluated this patient. I have signed out the follow up of any pending tests (i.e. labs, radiological studies) to the ACP.  I have communicated the patient’s plan of care and disposition with the ACP.

## 2019-01-19 NOTE — ED ADULT NURSE NOTE - OBJECTIVE STATEMENT
Pt states she found out 2 weeks ago she is pregnant, unsure how far long she is. Today at PT started having vag bleeding.

## 2019-01-20 LAB
CULTURE RESULTS: NO GROWTH — SIGNIFICANT CHANGE UP
SPECIMEN SOURCE: SIGNIFICANT CHANGE UP

## 2019-03-07 ENCOUNTER — APPOINTMENT (OUTPATIENT)
Dept: ULTRASOUND IMAGING | Facility: CLINIC | Age: 24
End: 2019-03-07
Payer: COMMERCIAL

## 2019-03-07 ENCOUNTER — OUTPATIENT (OUTPATIENT)
Dept: OUTPATIENT SERVICES | Facility: HOSPITAL | Age: 24
LOS: 1 days | End: 2019-03-07
Payer: COMMERCIAL

## 2019-03-07 DIAGNOSIS — Z00.8 ENCOUNTER FOR OTHER GENERAL EXAMINATION: ICD-10-CM

## 2019-03-07 DIAGNOSIS — Z98.891 HISTORY OF UTERINE SCAR FROM PREVIOUS SURGERY: Chronic | ICD-10-CM

## 2019-03-07 PROCEDURE — 76641 ULTRASOUND BREAST COMPLETE: CPT

## 2019-03-07 PROCEDURE — 76641 ULTRASOUND BREAST COMPLETE: CPT | Mod: 26,50

## 2019-04-01 ENCOUNTER — EMERGENCY (EMERGENCY)
Facility: HOSPITAL | Age: 24
LOS: 0 days | Discharge: HOME | End: 2019-04-02
Attending: EMERGENCY MEDICINE | Admitting: EMERGENCY MEDICINE
Payer: MEDICAID

## 2019-04-01 VITALS
TEMPERATURE: 98 F | DIASTOLIC BLOOD PRESSURE: 71 MMHG | OXYGEN SATURATION: 100 % | HEART RATE: 80 BPM | WEIGHT: 126.1 LBS | SYSTOLIC BLOOD PRESSURE: 103 MMHG | HEIGHT: 64 IN | RESPIRATION RATE: 18 BRPM

## 2019-04-01 DIAGNOSIS — Z98.891 HISTORY OF UTERINE SCAR FROM PREVIOUS SURGERY: Chronic | ICD-10-CM

## 2019-04-01 DIAGNOSIS — N39.0 URINARY TRACT INFECTION, SITE NOT SPECIFIED: ICD-10-CM

## 2019-04-01 DIAGNOSIS — R10.9 UNSPECIFIED ABDOMINAL PAIN: ICD-10-CM

## 2019-04-01 DIAGNOSIS — R30.0 DYSURIA: ICD-10-CM

## 2019-04-01 PROCEDURE — 99284 EMERGENCY DEPT VISIT MOD MDM: CPT | Mod: 25

## 2019-04-01 NOTE — ED ADULT NURSE NOTE - OBJECTIVE STATEMENT
A&Ox3. Patient comes in with LLQ abdominal pain x 3 days. Patient endorses nausea and vomiting. Patient states she has been unable to tolerate anything PO for 3 days. Patient well perfused, no signs of acute distress noted. Patient denies vaginal bleeding/fever/chills/diarrhea.

## 2019-04-02 VITALS
SYSTOLIC BLOOD PRESSURE: 106 MMHG | HEART RATE: 74 BPM | OXYGEN SATURATION: 100 % | TEMPERATURE: 98 F | DIASTOLIC BLOOD PRESSURE: 71 MMHG | RESPIRATION RATE: 18 BRPM

## 2019-04-02 LAB
APPEARANCE UR: CLEAR — SIGNIFICANT CHANGE UP
BACTERIA # UR AUTO: ABNORMAL
BILIRUB UR-MCNC: NEGATIVE — SIGNIFICANT CHANGE UP
COLOR SPEC: YELLOW — SIGNIFICANT CHANGE UP
COMMENT - URINE: SIGNIFICANT CHANGE UP
DIFF PNL FLD: ABNORMAL
EPI CELLS # UR: ABNORMAL
GLUCOSE UR QL: NEGATIVE MG/DL — SIGNIFICANT CHANGE UP
KETONES UR-MCNC: ABNORMAL
LEUKOCYTE ESTERASE UR-ACNC: ABNORMAL
NITRITE UR-MCNC: NEGATIVE — SIGNIFICANT CHANGE UP
PH UR: 5 — SIGNIFICANT CHANGE UP (ref 5–8)
PROT UR-MCNC: 15 MG/DL
RBC CASTS # UR COMP ASSIST: ABNORMAL /HPF (ref 0–4)
SP GR SPEC: 1.02 — SIGNIFICANT CHANGE UP (ref 1.01–1.02)
UROBILINOGEN FLD QL: 4 MG/DL
WBC UR QL: SIGNIFICANT CHANGE UP

## 2019-04-02 RX ORDER — IBUPROFEN 200 MG
400 TABLET ORAL ONCE
Qty: 0 | Refills: 0 | Status: COMPLETED | OUTPATIENT
Start: 2019-04-02 | End: 2019-04-02

## 2019-04-02 RX ORDER — NITROFURANTOIN MACROCRYSTAL 50 MG
1 CAPSULE ORAL
Qty: 20 | Refills: 0
Start: 2019-04-02 | End: 2019-04-11

## 2019-04-02 RX ORDER — IBUPROFEN 200 MG
1 TABLET ORAL
Qty: 40 | Refills: 0
Start: 2019-04-02 | End: 2019-04-11

## 2019-04-02 RX ORDER — NITROFURANTOIN MACROCRYSTAL 50 MG
100 CAPSULE ORAL ONCE
Qty: 0 | Refills: 0 | Status: COMPLETED | OUTPATIENT
Start: 2019-04-02 | End: 2019-04-02

## 2019-04-02 RX ADMIN — Medication 100 MILLIGRAM(S): at 01:13

## 2019-04-02 RX ADMIN — Medication 400 MILLIGRAM(S): at 01:29

## 2019-04-02 RX ADMIN — Medication 400 MILLIGRAM(S): at 00:46

## 2019-04-02 NOTE — ED PROVIDER NOTE - NSFOLLOWUPCLINICS_GEN_ALL_ED_FT
[facility]  Family Medicine  [address] [place] [address]  Phone: [phone]  Fax:   Follow Up Time:

## 2019-04-02 NOTE — ED PROVIDER NOTE - OBJECTIVE STATEMENT
24 y/o female in ED c/o suprapubic pain with dysuria x 3 days.    no meds taken for pain.    pt denies any fever, HA, cp, sob, n/v/d.    tolerating PO.   no sick contacts or recent travel.    pt denies any vaginal bleeding.   states LMP 3/8/19

## 2019-04-02 NOTE — ED PROVIDER NOTE - NSFOLLOWUPINSTRUCTIONS_ED_ALL_ED_FT
please follow up with [facility] in 2-3 days.   take medication as prescribed.    drink plenty of fluids.   return to ED for any concerns

## 2019-05-04 ENCOUNTER — EMERGENCY (EMERGENCY)
Facility: HOSPITAL | Age: 24
LOS: 0 days | Discharge: HOME | End: 2019-05-05
Attending: EMERGENCY MEDICINE | Admitting: EMERGENCY MEDICINE
Payer: MEDICAID

## 2019-05-04 VITALS
TEMPERATURE: 98 F | SYSTOLIC BLOOD PRESSURE: 106 MMHG | HEART RATE: 71 BPM | DIASTOLIC BLOOD PRESSURE: 69 MMHG | RESPIRATION RATE: 16 BRPM | OXYGEN SATURATION: 100 %

## 2019-05-04 VITALS — WEIGHT: 130.07 LBS | HEIGHT: 66 IN

## 2019-05-04 DIAGNOSIS — Z84.1 FAMILY HISTORY OF DISORDERS OF KIDNEY AND URETER: ICD-10-CM

## 2019-05-04 DIAGNOSIS — Z79.899 OTHER LONG TERM (CURRENT) DRUG THERAPY: ICD-10-CM

## 2019-05-04 DIAGNOSIS — M79.10 MYALGIA, UNSPECIFIED SITE: ICD-10-CM

## 2019-05-04 DIAGNOSIS — Z98.891 HISTORY OF UTERINE SCAR FROM PREVIOUS SURGERY: Chronic | ICD-10-CM

## 2019-05-04 DIAGNOSIS — M54.9 DORSALGIA, UNSPECIFIED: ICD-10-CM

## 2019-05-04 DIAGNOSIS — Z98.890 OTHER SPECIFIED POSTPROCEDURAL STATES: ICD-10-CM

## 2019-05-04 DIAGNOSIS — M54.5 LOW BACK PAIN: ICD-10-CM

## 2019-05-04 LAB
ALBUMIN SERPL ELPH-MCNC: 4.1 G/DL — SIGNIFICANT CHANGE UP (ref 3.3–5)
ALP SERPL-CCNC: 80 U/L — SIGNIFICANT CHANGE UP (ref 40–120)
ALT FLD-CCNC: 21 U/L — SIGNIFICANT CHANGE UP (ref 12–78)
ANION GAP SERPL CALC-SCNC: 5 MMOL/L — SIGNIFICANT CHANGE UP (ref 5–17)
APPEARANCE UR: CLEAR — SIGNIFICANT CHANGE UP
APTT BLD: 32.1 SEC — SIGNIFICANT CHANGE UP (ref 27.5–36.3)
AST SERPL-CCNC: 16 U/L — SIGNIFICANT CHANGE UP (ref 15–37)
BACTERIA # UR AUTO: ABNORMAL
BASOPHILS # BLD AUTO: 0.04 K/UL — SIGNIFICANT CHANGE UP (ref 0–0.2)
BASOPHILS NFR BLD AUTO: 0.4 % — SIGNIFICANT CHANGE UP (ref 0–2)
BILIRUB SERPL-MCNC: 0.6 MG/DL — SIGNIFICANT CHANGE UP (ref 0.2–1.2)
BILIRUB UR-MCNC: NEGATIVE — SIGNIFICANT CHANGE UP
BUN SERPL-MCNC: 9 MG/DL — SIGNIFICANT CHANGE UP (ref 7–23)
CALCIUM SERPL-MCNC: 8.8 MG/DL — SIGNIFICANT CHANGE UP (ref 8.5–10.1)
CHLORIDE SERPL-SCNC: 109 MMOL/L — HIGH (ref 96–108)
CO2 SERPL-SCNC: 26 MMOL/L — SIGNIFICANT CHANGE UP (ref 22–31)
COLOR SPEC: YELLOW — SIGNIFICANT CHANGE UP
CREAT SERPL-MCNC: 0.69 MG/DL — SIGNIFICANT CHANGE UP (ref 0.5–1.3)
DIFF PNL FLD: ABNORMAL
EOSINOPHIL # BLD AUTO: 0.13 K/UL — SIGNIFICANT CHANGE UP (ref 0–0.5)
EOSINOPHIL NFR BLD AUTO: 1.2 % — SIGNIFICANT CHANGE UP (ref 0–6)
EPI CELLS # UR: SIGNIFICANT CHANGE UP
GLUCOSE SERPL-MCNC: 88 MG/DL — SIGNIFICANT CHANGE UP (ref 70–99)
GLUCOSE UR QL: NEGATIVE MG/DL — SIGNIFICANT CHANGE UP
HCT VFR BLD CALC: 38.2 % — SIGNIFICANT CHANGE UP (ref 34.5–45)
HGB BLD-MCNC: 13.3 G/DL — SIGNIFICANT CHANGE UP (ref 11.5–15.5)
IMM GRANULOCYTES NFR BLD AUTO: 0.2 % — SIGNIFICANT CHANGE UP (ref 0–1.5)
INR BLD: 1.16 RATIO — SIGNIFICANT CHANGE UP (ref 0.88–1.16)
KETONES UR-MCNC: NEGATIVE — SIGNIFICANT CHANGE UP
LEUKOCYTE ESTERASE UR-ACNC: NEGATIVE — SIGNIFICANT CHANGE UP
LYMPHOCYTES # BLD AUTO: 39.4 % — SIGNIFICANT CHANGE UP (ref 13–44)
LYMPHOCYTES # BLD AUTO: 4.43 K/UL — HIGH (ref 1–3.3)
MCHC RBC-ENTMCNC: 30.5 PG — SIGNIFICANT CHANGE UP (ref 27–34)
MCHC RBC-ENTMCNC: 34.8 GM/DL — SIGNIFICANT CHANGE UP (ref 32–36)
MCV RBC AUTO: 87.6 FL — SIGNIFICANT CHANGE UP (ref 80–100)
MONOCYTES # BLD AUTO: 0.58 K/UL — SIGNIFICANT CHANGE UP (ref 0–0.9)
MONOCYTES NFR BLD AUTO: 5.2 % — SIGNIFICANT CHANGE UP (ref 2–14)
NEUTROPHILS # BLD AUTO: 6.04 K/UL — SIGNIFICANT CHANGE UP (ref 1.8–7.4)
NEUTROPHILS NFR BLD AUTO: 53.6 % — SIGNIFICANT CHANGE UP (ref 43–77)
NITRITE UR-MCNC: NEGATIVE — SIGNIFICANT CHANGE UP
NRBC # BLD: 0 /100 WBCS — SIGNIFICANT CHANGE UP (ref 0–0)
PH UR: 6.5 — SIGNIFICANT CHANGE UP (ref 5–8)
PLATELET # BLD AUTO: 298 K/UL — SIGNIFICANT CHANGE UP (ref 150–400)
POTASSIUM SERPL-MCNC: 3.6 MMOL/L — SIGNIFICANT CHANGE UP (ref 3.5–5.3)
POTASSIUM SERPL-SCNC: 3.6 MMOL/L — SIGNIFICANT CHANGE UP (ref 3.5–5.3)
PROT SERPL-MCNC: 7.4 GM/DL — SIGNIFICANT CHANGE UP (ref 6–8.3)
PROT UR-MCNC: NEGATIVE MG/DL — SIGNIFICANT CHANGE UP
PROTHROM AB SERPL-ACNC: 12.9 SEC — SIGNIFICANT CHANGE UP (ref 10–12.9)
RBC # BLD: 4.36 M/UL — SIGNIFICANT CHANGE UP (ref 3.8–5.2)
RBC # FLD: 11.8 % — SIGNIFICANT CHANGE UP (ref 10.3–14.5)
RBC CASTS # UR COMP ASSIST: ABNORMAL /HPF (ref 0–4)
SODIUM SERPL-SCNC: 140 MMOL/L — SIGNIFICANT CHANGE UP (ref 135–145)
SP GR SPEC: 1.01 — SIGNIFICANT CHANGE UP (ref 1.01–1.02)
UROBILINOGEN FLD QL: NEGATIVE MG/DL — SIGNIFICANT CHANGE UP
WBC # BLD: 11.24 K/UL — HIGH (ref 3.8–10.5)
WBC # FLD AUTO: 11.24 K/UL — HIGH (ref 3.8–10.5)
WBC UR QL: SIGNIFICANT CHANGE UP

## 2019-05-04 PROCEDURE — 99284 EMERGENCY DEPT VISIT MOD MDM: CPT

## 2019-05-04 PROCEDURE — 74176 CT ABD & PELVIS W/O CONTRAST: CPT | Mod: 26

## 2019-05-04 RX ORDER — SODIUM CHLORIDE 9 MG/ML
3 INJECTION INTRAMUSCULAR; INTRAVENOUS; SUBCUTANEOUS ONCE
Qty: 0 | Refills: 0 | Status: COMPLETED | OUTPATIENT
Start: 2019-05-04 | End: 2019-05-04

## 2019-05-04 RX ORDER — KETOROLAC TROMETHAMINE 30 MG/ML
30 SYRINGE (ML) INJECTION ONCE
Qty: 0 | Refills: 0 | Status: DISCONTINUED | OUTPATIENT
Start: 2019-05-04 | End: 2019-05-04

## 2019-05-04 RX ADMIN — Medication 30 MILLIGRAM(S): at 21:49

## 2019-05-04 RX ADMIN — SODIUM CHLORIDE 3 MILLILITER(S): 9 INJECTION INTRAMUSCULAR; INTRAVENOUS; SUBCUTANEOUS at 21:49

## 2019-05-04 NOTE — ED STATDOCS - PROGRESS NOTE DETAILS
22 yo female with no significant PMH presents with R sided back pain x 3 days, sharp, nonradiating, improves with advil and nothing that aggravates the pain. Denies n/v/d, f/c/sweating, hematuria, dysuria, frequency. +FH of kidney stones. Labs ct, ua, reeval. -Mckay Severino PA-C

## 2019-05-04 NOTE — ED STATDOCS - OBJECTIVE STATEMENT
24 y/o female with no significant PMHx presents to the ED c/o right sided back pain x3 days. Notes pain is sharp. Nontraumatic. Denies hematuria, dysuria. Family hx of kidney stones. No other complaints at this time.  ID#:[id number].

## 2019-05-04 NOTE — ED STATDOCS - ATTENDING CONTRIBUTION TO CARE
I Lars Solano MD saw and examined the patient. MLP saw and examined the patient under my supervision. I discussed the care of the patient with MLP and agree with MLP's plan, assessment and care of the patient while in the ED.

## 2019-05-04 NOTE — ED STATDOCS - CARE PLAN
Principal Discharge DX:	Acute right-sided low back pain without sciatica  Secondary Diagnosis:	Musculoskeletal pain

## 2019-05-04 NOTE — ED STATDOCS - NS_ ATTENDINGSCRIBEDETAILS _ED_A_ED_FT
I Lras Solano MD saw and examined the patient. Scribe documented for me and under my supervision. I have modified the scribe's documentation where necessary to reflect my history, physical exam and other relevant documentations pertinent to the care of the patient.

## 2019-05-04 NOTE — ED STATDOCS - CLINICAL SUMMARY MEDICAL DECISION MAKING FREE TEXT BOX
Plan: labs, hydration, CT, urine. Plan: labs, hydration, CT, urine.    Using , discussed results with patient. Advised that the pain is most likely related to msk pain, to continue to use advil/aleve/ibuprofen, apply heat to the area of pain, and the need to f/u with pmd. Pt aware and agrees with plan. -Mckay Severino PA-C Plan: labs, hydration, CT, urine.    Using  [id number], discussed results with patient. Advised that the pain is most likely related to msk pain, to continue to use advil/aleve/ibuprofen, apply heat to the area of pain, and the need to f/u with pmd. Pt aware and agrees with plan. -Mckay Severino PA-C

## 2019-05-06 LAB
CULTURE RESULTS: NO GROWTH — SIGNIFICANT CHANGE UP
SPECIMEN SOURCE: SIGNIFICANT CHANGE UP

## 2019-06-26 ENCOUNTER — EMERGENCY (EMERGENCY)
Facility: HOSPITAL | Age: 24
LOS: 0 days | Discharge: HOME | End: 2019-06-26
Attending: EMERGENCY MEDICINE | Admitting: EMERGENCY MEDICINE
Payer: MEDICAID

## 2019-06-26 VITALS
SYSTOLIC BLOOD PRESSURE: 112 MMHG | DIASTOLIC BLOOD PRESSURE: 67 MMHG | HEART RATE: 62 BPM | OXYGEN SATURATION: 99 % | RESPIRATION RATE: 16 BRPM | TEMPERATURE: 98 F

## 2019-06-26 VITALS
RESPIRATION RATE: 17 BRPM | OXYGEN SATURATION: 100 % | SYSTOLIC BLOOD PRESSURE: 108 MMHG | HEART RATE: 61 BPM | TEMPERATURE: 98 F | HEIGHT: 65 IN | DIASTOLIC BLOOD PRESSURE: 58 MMHG | WEIGHT: 119.93 LBS

## 2019-06-26 DIAGNOSIS — R10.30 LOWER ABDOMINAL PAIN, UNSPECIFIED: ICD-10-CM

## 2019-06-26 DIAGNOSIS — Z98.891 HISTORY OF UTERINE SCAR FROM PREVIOUS SURGERY: Chronic | ICD-10-CM

## 2019-06-26 DIAGNOSIS — N39.0 URINARY TRACT INFECTION, SITE NOT SPECIFIED: ICD-10-CM

## 2019-06-26 LAB
ALBUMIN SERPL ELPH-MCNC: 4.2 G/DL — SIGNIFICANT CHANGE UP (ref 3.3–5)
ALP SERPL-CCNC: 80 U/L — SIGNIFICANT CHANGE UP (ref 40–120)
ALT FLD-CCNC: 19 U/L — SIGNIFICANT CHANGE UP (ref 12–78)
ANION GAP SERPL CALC-SCNC: 7 MMOL/L — SIGNIFICANT CHANGE UP (ref 5–17)
APPEARANCE UR: ABNORMAL
AST SERPL-CCNC: 24 U/L — SIGNIFICANT CHANGE UP (ref 15–37)
BACTERIA # UR AUTO: ABNORMAL
BASOPHILS # BLD AUTO: 0.04 K/UL — SIGNIFICANT CHANGE UP (ref 0–0.2)
BASOPHILS NFR BLD AUTO: 0.5 % — SIGNIFICANT CHANGE UP (ref 0–2)
BILIRUB SERPL-MCNC: 1 MG/DL — SIGNIFICANT CHANGE UP (ref 0.2–1.2)
BILIRUB UR-MCNC: NEGATIVE — SIGNIFICANT CHANGE UP
BUN SERPL-MCNC: 6 MG/DL — LOW (ref 7–23)
CALCIUM SERPL-MCNC: 8.8 MG/DL — SIGNIFICANT CHANGE UP (ref 8.5–10.1)
CHLORIDE SERPL-SCNC: 108 MMOL/L — SIGNIFICANT CHANGE UP (ref 96–108)
CO2 SERPL-SCNC: 25 MMOL/L — SIGNIFICANT CHANGE UP (ref 22–31)
COLOR SPEC: YELLOW — SIGNIFICANT CHANGE UP
CREAT SERPL-MCNC: 0.74 MG/DL — SIGNIFICANT CHANGE UP (ref 0.5–1.3)
DIFF PNL FLD: ABNORMAL
EOSINOPHIL # BLD AUTO: 0.06 K/UL — SIGNIFICANT CHANGE UP (ref 0–0.5)
EOSINOPHIL NFR BLD AUTO: 0.8 % — SIGNIFICANT CHANGE UP (ref 0–6)
EPI CELLS # UR: ABNORMAL
GLUCOSE SERPL-MCNC: 80 MG/DL — SIGNIFICANT CHANGE UP (ref 70–99)
GLUCOSE UR QL: NEGATIVE MG/DL — SIGNIFICANT CHANGE UP
HCT VFR BLD CALC: 39.8 % — SIGNIFICANT CHANGE UP (ref 34.5–45)
HGB BLD-MCNC: 14 G/DL — SIGNIFICANT CHANGE UP (ref 11.5–15.5)
IMM GRANULOCYTES NFR BLD AUTO: 0.1 % — SIGNIFICANT CHANGE UP (ref 0–1.5)
KETONES UR-MCNC: ABNORMAL
LEUKOCYTE ESTERASE UR-ACNC: ABNORMAL
LYMPHOCYTES # BLD AUTO: 3.52 K/UL — HIGH (ref 1–3.3)
LYMPHOCYTES # BLD AUTO: 44.7 % — HIGH (ref 13–44)
MCHC RBC-ENTMCNC: 30.9 PG — SIGNIFICANT CHANGE UP (ref 27–34)
MCHC RBC-ENTMCNC: 35.2 GM/DL — SIGNIFICANT CHANGE UP (ref 32–36)
MCV RBC AUTO: 87.9 FL — SIGNIFICANT CHANGE UP (ref 80–100)
MONOCYTES # BLD AUTO: 0.52 K/UL — SIGNIFICANT CHANGE UP (ref 0–0.9)
MONOCYTES NFR BLD AUTO: 6.6 % — SIGNIFICANT CHANGE UP (ref 2–14)
NEUTROPHILS # BLD AUTO: 3.72 K/UL — SIGNIFICANT CHANGE UP (ref 1.8–7.4)
NEUTROPHILS NFR BLD AUTO: 47.3 % — SIGNIFICANT CHANGE UP (ref 43–77)
NITRITE UR-MCNC: POSITIVE
PH UR: 6 — SIGNIFICANT CHANGE UP (ref 5–8)
PLATELET # BLD AUTO: 274 K/UL — SIGNIFICANT CHANGE UP (ref 150–400)
POTASSIUM SERPL-MCNC: 3.4 MMOL/L — LOW (ref 3.5–5.3)
POTASSIUM SERPL-SCNC: 3.4 MMOL/L — LOW (ref 3.5–5.3)
PROT SERPL-MCNC: 7.6 GM/DL — SIGNIFICANT CHANGE UP (ref 6–8.3)
PROT UR-MCNC: 15 MG/DL
RBC # BLD: 4.53 M/UL — SIGNIFICANT CHANGE UP (ref 3.8–5.2)
RBC # FLD: 11.9 % — SIGNIFICANT CHANGE UP (ref 10.3–14.5)
RBC CASTS # UR COMP ASSIST: SIGNIFICANT CHANGE UP /HPF (ref 0–4)
SODIUM SERPL-SCNC: 140 MMOL/L — SIGNIFICANT CHANGE UP (ref 135–145)
SP GR SPEC: 1.01 — SIGNIFICANT CHANGE UP (ref 1.01–1.02)
UROBILINOGEN FLD QL: 4 MG/DL
WBC # BLD: 7.87 K/UL — SIGNIFICANT CHANGE UP (ref 3.8–10.5)
WBC # FLD AUTO: 7.87 K/UL — SIGNIFICANT CHANGE UP (ref 3.8–10.5)
WBC UR QL: SIGNIFICANT CHANGE UP

## 2019-06-26 PROCEDURE — 99284 EMERGENCY DEPT VISIT MOD MDM: CPT

## 2019-06-26 RX ORDER — NITROFURANTOIN MACROCRYSTAL 50 MG
1 CAPSULE ORAL
Qty: 14 | Refills: 0
Start: 2019-06-26 | End: 2019-07-02

## 2019-06-26 RX ORDER — SODIUM CHLORIDE 9 MG/ML
1000 INJECTION INTRAMUSCULAR; INTRAVENOUS; SUBCUTANEOUS ONCE
Refills: 0 | Status: COMPLETED | OUTPATIENT
Start: 2019-06-26 | End: 2019-06-26

## 2019-06-26 RX ORDER — NITROFURANTOIN MACROCRYSTAL 50 MG
100 CAPSULE ORAL ONCE
Refills: 0 | Status: COMPLETED | OUTPATIENT
Start: 2019-06-26 | End: 2019-06-26

## 2019-06-26 RX ORDER — POTASSIUM CHLORIDE 20 MEQ
20 PACKET (EA) ORAL ONCE
Refills: 0 | Status: COMPLETED | OUTPATIENT
Start: 2019-06-26 | End: 2019-06-26

## 2019-06-26 RX ORDER — ONDANSETRON 8 MG/1
4 TABLET, FILM COATED ORAL ONCE
Refills: 0 | Status: COMPLETED | OUTPATIENT
Start: 2019-06-26 | End: 2019-06-26

## 2019-06-26 RX ADMIN — SODIUM CHLORIDE 1000 MILLILITER(S): 9 INJECTION INTRAMUSCULAR; INTRAVENOUS; SUBCUTANEOUS at 13:31

## 2019-06-26 RX ADMIN — SODIUM CHLORIDE 1000 MILLILITER(S): 9 INJECTION INTRAMUSCULAR; INTRAVENOUS; SUBCUTANEOUS at 12:35

## 2019-06-26 RX ADMIN — Medication 100 MILLIGRAM(S): at 13:30

## 2019-06-26 RX ADMIN — ONDANSETRON 4 MILLIGRAM(S): 8 TABLET, FILM COATED ORAL at 12:35

## 2019-06-26 RX ADMIN — Medication 20 MILLIEQUIVALENT(S): at 13:30

## 2019-06-26 NOTE — ED ADULT TRIAGE NOTE - CHIEF COMPLAINT QUOTE
Patient presents complaining of lower abdominal/suprapubic pain for 3 days. with nausea and vomiting.

## 2019-06-26 NOTE — ED STATDOCS - CLINICAL SUMMARY MEDICAL DECISION MAKING FREE TEXT BOX
Labs, urine, Zofran. Labs, urine, Zofran. Negative pregnancy test +UTI Rx. Macrobid and will give dose of Potassium.

## 2019-06-26 NOTE — ED ADULT NURSE NOTE - OBJECTIVE STATEMENT
c/o low abdominal pain x 3 days, reports nausea denies vomiting, fever, chills. Last menstrual period , 1 day with light bleeding. Hx of .

## 2019-06-26 NOTE — ED ADULT NURSE NOTE - NSIMPLEMENTINTERV_GEN_ALL_ED
Implemented All Universal Safety Interventions:  [place] to call system. Call bell, personal items and telephone within reach. Instruct patient to call for assistance. Room bathroom lighting operational. Non-slip footwear when patient is off stretcher. Physically safe environment: no spills, clutter or unnecessary equipment. Stretcher in lowest position, wheels locked, appropriate side rails in place.

## 2019-06-26 NOTE — ED STATDOCS - PROGRESS NOTE DETAILS
23 yr. old female PMH: A0 presents to ED with low abdominal pain radiating to back bilaterally onset 3 days ago. +Nausea +vomiting.  LMP 5/10 mild vaginal spotting 3 days ago. No fevr or chills. No diarrhea. Non smoker, non drinker. Seen and examined by attending in [place]. Plan: IV, labs, UA/UC. Will F/u with results and re evaluate. Liz NP Results of Lab work and UA discussed with patient. Negative pregnacy test. Will Rx. Macrobid. MTangredi NP

## 2019-06-26 NOTE — ED STATDOCS - GASTROINTESTINAL, MLM
abdomen soft, non-tender, and non-distended. Bowel sounds present. abdomen soft, and non-distended. Bowel sounds present. +mild b/l LQ and suprapubic TTP.

## 2019-06-26 NOTE — ED STATDOCS - OBJECTIVE STATEMENT
24 y/o F with a PMHx of  presenting to the ED c/o low abd pain for the past 3 days associated with N/V. Pt also reports mild vaginal spotting 3 days ago. Pain radiates to pt's back. Pt has 1 child; and states there is a possibility she is currently pregnant. LNMP: 5/10/2019. Pt has not done home pregnancy test. Denies fever, diarrhea, chills, major operations, tobacco use, EtOH use, or dysuria. PMD: Dr. Young.  #: [id number]

## 2019-09-28 ENCOUNTER — EMERGENCY (EMERGENCY)
Facility: HOSPITAL | Age: 24
LOS: 0 days | Discharge: HOME | End: 2019-09-28
Attending: EMERGENCY MEDICINE
Payer: MEDICAID

## 2019-09-28 VITALS — WEIGHT: 130.95 LBS | HEIGHT: 61 IN

## 2019-09-28 VITALS
TEMPERATURE: 98 F | DIASTOLIC BLOOD PRESSURE: 60 MMHG | RESPIRATION RATE: 18 BRPM | SYSTOLIC BLOOD PRESSURE: 101 MMHG | HEART RATE: 69 BPM | OXYGEN SATURATION: 100 %

## 2019-09-28 DIAGNOSIS — Z98.891 HISTORY OF UTERINE SCAR FROM PREVIOUS SURGERY: Chronic | ICD-10-CM

## 2019-09-28 DIAGNOSIS — H60.331 SWIMMER'S EAR, RIGHT EAR: ICD-10-CM

## 2019-09-28 DIAGNOSIS — H92.01 OTALGIA, RIGHT EAR: ICD-10-CM

## 2019-09-28 PROCEDURE — 99283 EMERGENCY DEPT VISIT LOW MDM: CPT

## 2019-09-28 RX ORDER — CIPROFLOXACIN AND DEXAMETHASONE 3; 1 MG/ML; MG/ML
4 SUSPENSION/ DROPS AURICULAR (OTIC)
Qty: 1 | Refills: 0
Start: 2019-09-28 | End: 2019-10-04

## 2019-09-28 NOTE — ED STATDOCS - PATIENT PORTAL LINK FT
You can access the FollowMyHealth Patient Portal offered by [facility] by registering at the following website: [email or web address]. By joining [employer]’s FollowMyHealth portal, you will also be able to view your health information using other applications (apps) compatible with our system.

## 2019-09-28 NOTE — ED STATDOCS - OBJECTIVE STATEMENT
24 y/o female with no pertinent PMHx presents to the ED c/o right ear pain x3 days. Denies fever, throat pain, any other acute complaints at this time. No recent swimming. NKDA.

## 2019-09-28 NOTE — ED STATDOCS - ENMT, MLM
Nasal mucosa clear.  Mouth with normal mucosa  Throat has no vesicles, no oropharyngeal exudates and uvula is midline. +right ear with swollen, red and tender canal

## 2019-12-15 ENCOUNTER — EMERGENCY (EMERGENCY)
Facility: HOSPITAL | Age: 24
LOS: 0 days | Discharge: HOME | End: 2019-12-15
Attending: EMERGENCY MEDICINE
Payer: MEDICAID

## 2019-12-15 VITALS
RESPIRATION RATE: 18 BRPM | TEMPERATURE: 98 F | HEART RATE: 64 BPM | SYSTOLIC BLOOD PRESSURE: 100 MMHG | DIASTOLIC BLOOD PRESSURE: 63 MMHG | OXYGEN SATURATION: 98 %

## 2019-12-15 DIAGNOSIS — R11.0 NAUSEA: ICD-10-CM

## 2019-12-15 DIAGNOSIS — N30.90 CYSTITIS, UNSPECIFIED WITHOUT HEMATURIA: ICD-10-CM

## 2019-12-15 DIAGNOSIS — R51 HEADACHE: ICD-10-CM

## 2019-12-15 DIAGNOSIS — Z87.440 PERSONAL HISTORY OF URINARY (TRACT) INFECTIONS: ICD-10-CM

## 2019-12-15 DIAGNOSIS — Z98.891 HISTORY OF UTERINE SCAR FROM PREVIOUS SURGERY: Chronic | ICD-10-CM

## 2019-12-15 DIAGNOSIS — R10.30 LOWER ABDOMINAL PAIN, UNSPECIFIED: ICD-10-CM

## 2019-12-15 LAB
ALBUMIN SERPL ELPH-MCNC: 3.7 G/DL — SIGNIFICANT CHANGE UP (ref 3.3–5)
ALP SERPL-CCNC: 76 U/L — SIGNIFICANT CHANGE UP (ref 40–120)
ALT FLD-CCNC: 19 U/L — SIGNIFICANT CHANGE UP (ref 12–78)
ANION GAP SERPL CALC-SCNC: 4 MMOL/L — LOW (ref 5–17)
APPEARANCE UR: CLEAR — SIGNIFICANT CHANGE UP
AST SERPL-CCNC: 16 U/L — SIGNIFICANT CHANGE UP (ref 15–37)
BASOPHILS # BLD AUTO: 0.04 K/UL — SIGNIFICANT CHANGE UP (ref 0–0.2)
BASOPHILS NFR BLD AUTO: 0.5 % — SIGNIFICANT CHANGE UP (ref 0–2)
BILIRUB SERPL-MCNC: 0.4 MG/DL — SIGNIFICANT CHANGE UP (ref 0.2–1.2)
BILIRUB UR-MCNC: NEGATIVE — SIGNIFICANT CHANGE UP
BUN SERPL-MCNC: 8 MG/DL — SIGNIFICANT CHANGE UP (ref 7–23)
CALCIUM SERPL-MCNC: 8.7 MG/DL — SIGNIFICANT CHANGE UP (ref 8.5–10.1)
CHLORIDE SERPL-SCNC: 110 MMOL/L — HIGH (ref 96–108)
CO2 SERPL-SCNC: 26 MMOL/L — SIGNIFICANT CHANGE UP (ref 22–31)
COLOR SPEC: YELLOW — SIGNIFICANT CHANGE UP
CREAT SERPL-MCNC: 0.89 MG/DL — SIGNIFICANT CHANGE UP (ref 0.5–1.3)
DIFF PNL FLD: ABNORMAL
EOSINOPHIL # BLD AUTO: 0.13 K/UL — SIGNIFICANT CHANGE UP (ref 0–0.5)
EOSINOPHIL NFR BLD AUTO: 1.6 % — SIGNIFICANT CHANGE UP (ref 0–6)
GLUCOSE SERPL-MCNC: 93 MG/DL — SIGNIFICANT CHANGE UP (ref 70–99)
GLUCOSE UR QL: NEGATIVE MG/DL — SIGNIFICANT CHANGE UP
HCT VFR BLD CALC: 41 % — SIGNIFICANT CHANGE UP (ref 34.5–45)
HGB BLD-MCNC: 14 G/DL — SIGNIFICANT CHANGE UP (ref 11.5–15.5)
IMM GRANULOCYTES NFR BLD AUTO: 0.1 % — SIGNIFICANT CHANGE UP (ref 0–1.5)
KETONES UR-MCNC: NEGATIVE — SIGNIFICANT CHANGE UP
LEUKOCYTE ESTERASE UR-ACNC: NEGATIVE — SIGNIFICANT CHANGE UP
LYMPHOCYTES # BLD AUTO: 3.57 K/UL — HIGH (ref 1–3.3)
LYMPHOCYTES # BLD AUTO: 43.2 % — SIGNIFICANT CHANGE UP (ref 13–44)
MCHC RBC-ENTMCNC: 29.6 PG — SIGNIFICANT CHANGE UP (ref 27–34)
MCHC RBC-ENTMCNC: 34.1 GM/DL — SIGNIFICANT CHANGE UP (ref 32–36)
MCV RBC AUTO: 86.7 FL — SIGNIFICANT CHANGE UP (ref 80–100)
MONOCYTES # BLD AUTO: 0.48 K/UL — SIGNIFICANT CHANGE UP (ref 0–0.9)
MONOCYTES NFR BLD AUTO: 5.8 % — SIGNIFICANT CHANGE UP (ref 2–14)
NEUTROPHILS # BLD AUTO: 4.03 K/UL — SIGNIFICANT CHANGE UP (ref 1.8–7.4)
NEUTROPHILS NFR BLD AUTO: 48.8 % — SIGNIFICANT CHANGE UP (ref 43–77)
NITRITE UR-MCNC: NEGATIVE — SIGNIFICANT CHANGE UP
PH UR: 5 — SIGNIFICANT CHANGE UP (ref 5–8)
PLATELET # BLD AUTO: 288 K/UL — SIGNIFICANT CHANGE UP (ref 150–400)
POTASSIUM SERPL-MCNC: 3.6 MMOL/L — SIGNIFICANT CHANGE UP (ref 3.5–5.3)
POTASSIUM SERPL-SCNC: 3.6 MMOL/L — SIGNIFICANT CHANGE UP (ref 3.5–5.3)
PROT SERPL-MCNC: 7.1 GM/DL — SIGNIFICANT CHANGE UP (ref 6–8.3)
PROT UR-MCNC: NEGATIVE MG/DL — SIGNIFICANT CHANGE UP
RBC # BLD: 4.73 M/UL — SIGNIFICANT CHANGE UP (ref 3.8–5.2)
RBC # FLD: 11.6 % — SIGNIFICANT CHANGE UP (ref 10.3–14.5)
SODIUM SERPL-SCNC: 140 MMOL/L — SIGNIFICANT CHANGE UP (ref 135–145)
SP GR SPEC: 1.01 — SIGNIFICANT CHANGE UP (ref 1.01–1.02)
UROBILINOGEN FLD QL: NEGATIVE MG/DL — SIGNIFICANT CHANGE UP
WBC # BLD: 8.26 K/UL — SIGNIFICANT CHANGE UP (ref 3.8–10.5)
WBC # FLD AUTO: 8.26 K/UL — SIGNIFICANT CHANGE UP (ref 3.8–10.5)

## 2019-12-15 PROCEDURE — 99284 EMERGENCY DEPT VISIT MOD MDM: CPT

## 2019-12-15 PROCEDURE — 74177 CT ABD & PELVIS W/CONTRAST: CPT

## 2019-12-15 PROCEDURE — 81025 URINE PREGNANCY TEST: CPT

## 2019-12-15 PROCEDURE — 80053 COMPREHEN METABOLIC PANEL: CPT

## 2019-12-15 PROCEDURE — 85025 COMPLETE CBC W/AUTO DIFF WBC: CPT

## 2019-12-15 PROCEDURE — 74177 CT ABD & PELVIS W/CONTRAST: CPT | Mod: 26

## 2019-12-15 PROCEDURE — 99284 EMERGENCY DEPT VISIT MOD MDM: CPT | Mod: 25

## 2019-12-15 PROCEDURE — 36415 COLL VENOUS BLD VENIPUNCTURE: CPT

## 2019-12-15 PROCEDURE — 87086 URINE CULTURE/COLONY COUNT: CPT

## 2019-12-15 PROCEDURE — 81001 URINALYSIS AUTO W/SCOPE: CPT

## 2019-12-15 RX ORDER — IBUPROFEN 200 MG
400 TABLET ORAL ONCE
Refills: 0 | Status: COMPLETED | OUTPATIENT
Start: 2019-12-15 | End: 2019-12-15

## 2019-12-15 RX ORDER — CEPHALEXIN 500 MG
1 CAPSULE ORAL
Qty: 14 | Refills: 0
Start: 2019-12-15 | End: 2019-12-21

## 2019-12-15 RX ADMIN — Medication 400 MILLIGRAM(S): at 17:39

## 2019-12-15 NOTE — ED STATDOCS - PATIENT PORTAL LINK FT
You can access the FollowMyHealth Patient Portal offered by [facility] by registering at the following website: [email or web address]. By joining [employer]’s FollowMyHealth portal, you will also be able to view your health information using other applications (apps) compatible with our system.

## 2019-12-15 NOTE — ED STATDOCS - GASTROINTESTINAL, MLM
abdomen soft and non-distended. Bowel sounds present. +mild lower abdominal TTP without rebound or guarding

## 2019-12-15 NOTE — ED ADULT TRIAGE NOTE - CHIEF COMPLAINT QUOTE
Patient comes in with lower abdominal pain x 1 week and headache x 1 day. Patient denies nausea/vomiting/fever/urinary symptoms. No signs of acute distress noted.

## 2019-12-15 NOTE — ED STATDOCS - ATTENDING CONTRIBUTION TO CARE
I, Yanely Hernandez MD,  performed the initial face to face bedside interview with this patient regarding history of present illness, review of symptoms and relevant past medical, social and family history.  I completed an independent physical examination.  I was the initial provider who evaluated this patient. I have signed out the follow up of any pending tests (i.e. labs, radiological studies) to the ACP.  I have communicated the patient’s plan of care and disposition with the ACP.  The history, relevant review of systems, past medical and surgical history, medical decision making, and physical examination was documented by the scribe in my presence and I attest to the accuracy of the documentation.

## 2019-12-15 NOTE — ED STATDOCS - PROGRESS NOTE
Initial Clinical Review    Admission: Date/Time/Statement: 08/19/2018 @ 1239  OBSERVATION  Orders Placed This Encounter   Procedures    Place in Observation (expected length of stay for this patient is less than two midnights)     Standing Status:   Standing     Number of Occurrences:   1     Order Specific Question:   Admitting Physician     Answer:   Michael Arambula [[id number]]     Order Specific Question:   Level of Care     Answer:   Med Surg [16]         ED: Date/Time/Mode of Arrival:   ED Arrival Information     Expected Arrival Acuity Means of Arrival Escorted By Service Admission Type    - 8/19/2018 09:17 Emergent Walk-In Self General Medicine Emergency    Arrival Complaint    Congestion  /  SOB  / Chest Pain          Chief Complaint:   Chief Complaint   Patient presents with    Chest Pain     Pt presents with chest tightness x2 weeks  Pt was admitted and released 4 days ago for the same complaint, states it was CHF related  History of Illness:   Shawn Flores is a 79 y o  female who presents with worsening of shortness of breath and chest pain and lower extremity edema  Patient was admitted to the hospital from [id number] with CHF exacerbation and new onset atrial fibrillation and she was discharged home  At home patient was on Lasix and she reported that she was compliant with fluid and salt restriction and also was taking her medications appropriately  [facility] was following the patient as an outpatient  Patient reported that gradually for the past 1 week she noticed that her legs are getting more swollen and she was also getting progressively more short of breath  Patient gives history of orthopnea  Patient is also complaining of chest pain which is diffuse  Patient was evaluated by Cardiology in the emergency room and started on the Bumex  Patient with chronic kidney disease status post nephro ureterectomy on the right side for urothelial malignancy    Patient with known history of CVA and was on Xarelto as an outpatient  The patient reported that when she was discharged from the hospital she was on antibiotics for possible cellulitis and also pulmonary infection and she is having a vaginal yeast infection  Patient reported that she has severe itching and she has been using Vaseline at home  ED Vital Signs:   ED Triage Vitals   Temperature Pulse Respirations Blood Pressure SpO2   18 0923 18 0922 18 0922 18 0922 18 0922   98 2 °F (36 8 °C) 85 18 111/52 92 %      Temp Source Heart Rate Source Patient Position - Orthostatic VS BP Location FiO2 (%)   18 0923 18 0930 18 0930 18 09 --   Tympanic Monitor Lying Right arm       Pain Score       18 09       No Pain        Wt Readings from Last 1 Encounters:   18 98 7 kg (217 lb 9 5 oz)       Abnormal Labs/Diagnostic Test Results:   WBC Thousand/uL 9 58   HEMOGLOBIN g/dL 8 6*   HEMATOCRIT % 26 6*   PLATELETS Thousands/uL 326     SODIUM mmol/L 133*   POTASSIUM mmol/L 3 8   CHLORIDE mmol/L 101   CO2 mmol/L 25   BUN mg/dL 31*   CREATININE mg/dL 1 64*   CALCIUM mg/dL 8 7   TOTAL PROTEIN g/dL 7 2   BILIRUBIN TOTAL mg/dL 0 66   ALK PHOS U/L 104   ALT U/L 15   AST U/L 14   GLUCOSE RANDOM mg/dL 148*     INR   1 80*     EC, A  Fib    Chest X: pending     Troponin I <0 02 ng/mL     NT-proBNP 2,615 (H) pg/mL       ED Treatment:   Medication Administration from 2018 0916 to 2018 1706       Date/Time Order Dose Route Action     2018 1353 bumetanide (BUMEX) injection 2 mg 2 mg Intravenous Given          Past Medical/Surgical History:    Active Ambulatory Problems     Diagnosis Date Noted    Cerebrovascular accident (CVA) due to thrombosis of right middle cerebral artery ([facility] ) 05/10/2017    Essential hypertension 05/10/2017    Rheumatoid arthritis ([facility] ) 05/10/2017    Diabetes mellitus type 2 in obese ([facility] ) 05/10/2017    Sleep apnea 05/10/2017    Acquired hypothyroidism 05/10/2017  Chronic GERD 05/10/2017    Carpal tunnel syndrome, left 09/15/2017    Diverticulosis of colon 08/29/2013    Edema 05/14/2013    Hematuria 09/15/2017    Hypercholesterolemia 08/29/2013    Lymphedema 09/15/2017    Obesity 07/18/2013    Overactive bladder 05/24/2016    Peripheral neuropathy 11/09/2017    Prediabetes 05/24/2016    Primary osteoarthritis of left knee 10/13/2017    Restless leg syndrome 01/05/2016    Right lumbar radiculopathy 11/09/2017    Sensorineural hearing loss 10/12/2014    Sensory urge incontinence 09/11/2017    Sinus arrhythmia 01/03/2018    Chronic bilateral thoracic back pain 02/12/2018    Thoracic degenerative disc disease 02/12/2018    Urothelial cancer ([facility] ) 03/02/2018    Lung nodule < 6cm on CT 03/02/2018    Pancreatic cyst 03/14/2018    Iron deficiency anemia 03/14/2018    Anemia 04/25/2018    BRENNEN (acute kidney injury) ([address] ) 04/25/2018    Hyponatremia 04/25/2018    PAD (peripheral artery disease) ([facility] ) 06/04/2018    Bilateral edema of lower extremity 06/04/2018    History of nephroureterectomy 06/21/2018    Incisional hernia 06/28/2018    Chest pain 08/09/2018    Hypertensive urgency 08/09/2018    Leucocytosis 08/10/2018    Pain and swelling of lower extremity 08/10/2018    Atrial fibrillation ([address] ) 08/11/2018    CKD (chronic kidney disease) stage 3, GFR 30-59 ml/min 08/12/2018     Resolved Ambulatory Problems     Diagnosis Date Noted    Urinary tract infection 05/10/2017    Acute blood loss anemia 12/13/2012    Benign essential hypertension 07/09/2012    Renal neoplasm 02/06/2018    Gross hematuria 02/08/2018    Acute urinary retention 03/02/2018    Urothelial lesion 03/12/2018    Therapeutic opioid-induced constipation (OIC) 04/26/2018    Acute on chronic diastolic congestive heart failure ([address] ) 08/09/2018     Past Medical History:   Diagnosis Date    Anemia     Arthritis     Benign essential hypertension     Cataract     Chronic cystitis     CPAP (continuous positive airway pressure) dependence     Diverticulitis of colon     Early satiety     GERD (gastroesophageal reflux disease)     Gout     Hearing aid worn     Hearing loss     Hemorrhoids     Hiatal hernia     History of colonic polyps     Hyperlipidemia     Hypothyroidism     Left breast mass     Microhematuria     Migraine     Neuropathy     Overactive bladder     Pneumonia of left lower lobe due to infectious organism (HCC)     RA [facility]heumatoid arthritis) [facility] )     Sleep apnea     Stroke [facility] )     Type 2 diabetes mellitus (HCC)     Urinary frequency     Urinary urgency     Urothelial cancer ([facility] ) 04/23/2018    Use of cane as ambulatory aid        Admitting Diagnosis: Orthopnea [R06 01]  Shortness of breath [R06 02]  Chest pain [T83 7]  Diastolic dysfunction [N96 2]  CKD (chronic kidney disease) stage 3, GFR 30-59 ml/min [N18 3]  Acute on chronic diastolic congestive heart failure (HCC) [I50 33]    Age/Sex: 79 y o  female    Assessment/Plan:   * Acute on chronic congestive heart failure (HCC)   Assessment & Plan     · Likely diastolic congestive heart failure  · Patient was recently admitted to the hospital with acute CHF atrial fibrillation and was discharged on Lasix  · Cardiology evaluated the patient and started on Bumex  · Discharging weight was 201 lb and today she is 220 lb  · Started on Bumex by Cardiology in emergency room  · I/O, daily weights  · Patient reported that she has been compliant with medication and also dietary limitations          CKD (chronic kidney disease) stage 3, GFR 30-59 ml/min   Assessment & Plan     · CKD stage 3 status post right nephrectomy for urothelial malignancy  · Monitor creatinine with diuresis  · If the creatinine remains elevated may have to change the Xarelto to Eliquis             Atrial fibrillation [facility])   Assessment & Plan     · Patient was diagnosed with atrial fibrillation recently  · Heart rate control  · Patient was on Xarelto-patient with recent nephrectomy  If the creatinine remains elevated may have to change to Eliquis  · Patient with history of CVA in the past          Chest pain   Assessment & Plan     · Patient complaining of chest pain has been going on for about a week  · Likely secondary to fluid overload  · Patient is chronically anticoagulated on Xarelto  · Chest x-ray reviewed  · Pain control          Acquired hypothyroidism   Assessment & Plan     · Continue with Synthroid          Diabetes mellitus type 2 in obese [facility])   Assessment & Plan             Lab Results   Component Value Date     HGBA1C 6 3% 06/13/2018         No results for input(s): POCGLU in the last 72 hours      Blood Sugar Average: Last 72 hrs:   will start on insulin sliding scale          Essential hypertension   Assessment & Plan     · Blood pressure acceptable  · Continue with the medication monitor          History of nephroureterectomy   Assessment & Plan     · Patient with urothelial malignancy, status post right u nephroureterectomy  · Follow-up with Urology as an outpatient          Iron deficiency anemia   Assessment & Plan     · Hemoglobin appears to be 8 6 which is stable when compared to before  · Monitor          Rheumatoid arthritis ([facility] )   Assessment & Plan     · Patient is on methotrexate, leukovorin  and Plaquenil as an outpatient          Cerebrovascular accident (CVA) due to thrombosis of right middle cerebral artery ([facility] )   Assessment & Plan     · History of CVA in the past  · Continue with Xarelto             VTE Prophylaxis: Rivaroxaban (Xarelto)  / sequential compression device      Anticipated Length of Stay:  Patient will be admitted on an Observation basis with an anticipated length of stay of  less  than2 midnights     Justification for Hospital Stay:  Congestive heart failure       Admission Orders:  Scheduled Meds:   Current Facility-Administered Medications:  acetaminophen 650 mg Oral Q6H PRN albuterol 2 puff Inhalation Q6H PRN   bumetanide 2 mg Intravenous BID   calcium carbonate 1,000 mg Oral Daily PRN   calcium carbonate-vitamin D 1 tablet Oral BID With Meals   cyanocobalamin 100 mcg Oral Daily   docusate sodium 100 mg Oral BID   gabapentin 100 mg Oral TID   hydroxychloroquine 200 mg Oral BID With Meals   levothyroxine 75 mcg Oral Daily   miconazole  Topical BID PRN   And      miconazole 200 mg Vaginal HS   ondansetron 4 mg Intravenous Q6H PRN   pantoprazole 40 mg Oral Early Morning   pravastatin 80 mg Oral Daily   rivaroxaban 15 mg Oral Daily With Breakfast   rOPINIRole 0 5 mg Oral Daily   rOPINIRole 1 mg Oral HS   simethicone 80 mg Oral 4x Daily PRN     Consult PT  Consult Cardio  TELM  Juan Daniel SCDs Stable.

## 2019-12-15 NOTE — ED STATDOCS - OBJECTIVE STATEMENT
23 y/o female with PMHx of UTI, s/p  presents to the ED c/o lower abdominal pain x1 week and headache x3 days. +nausea. Also reports pain with urination. Denies fever, v/d. Has never experienced these sx in the past. Has had a UTI in the past but not with these sx. No recent sick contacts.

## 2019-12-15 NOTE — ED STATDOCS - NSFOLLOWUPINSTRUCTIONS_ED_ALL_ED_FT
Infección urinaria en los adultos  Urinary Tract Infection, Adult  Kaz infección urinaria (IU) puede ocurrir en cualquier lugar de las vías urinarias. Las vías urinarias incluyen lo siguiente:  Los riñones.Los uréteres.La vejiga.La uretra.Estos órganos fabrican, almacenan y eliminan el pis (orina) del cuerpo.  ¿Cuáles son las causas?  La causa es la presencia de gérmenes (bacterias) en la tabitha genital. Estos gérmenes proliferan y causan hinchazón (inflamación) de las vías urinarias.  ¿Qué incrementa el riesgo?  Es más probable que contraiga esta afección si:  Tiene colocado un tubo mathis y pequeño (catéter) para drenar el pis.No puede controlar la evacuación de pis ni de materia fecal (incontinencia).Es eloy y, además:  Usa estos métodos para evitar el embarazo:  Un medicamento que adan los espermatozoides (espermicida).Un dispositivo que impide el paso de los espermatozoides (diafragma).Tiene niveles bajos de kaz hormona femenina (estrógeno).Está embarazada.Tiene genes que aumentan bustos riesgo.Es sexualmente activa.Missy antibióticos. Tiene dificultad para orinar debido a:  Bustos próstata es más sue de lo normal, si usted es hombre.Obstrucción en la parte del cuerpo que drena el pis de la vejiga (uretra).Cálculo renal. Un trastorno nervioso que afecta la vejiga (vejiga neurógena).No jj kaz cantidad suficiente de líquido. No hace pis con la frecuencia suficiente.Tiene otras afecciones, luana:  Diabetes. Un sistema que combate las enfermedades (sistema inmunitario) debilitado.Anemia drepanocítica. Gota. Lesión en la columna vertebral.¿Cuáles son los signos o los síntomas?  Los síntomas de esta afección incluyen:  Necesidad inmediata (urgente) de hacer pis.Hacer pis con frecuencia.Hacer poca cantidad de pis con mucha frecuencia.Dolor o ardor al hacer pis.Jakob en el pis.Pis que huele mal o anormal.Dificultad para hacer pis.Pis turbio.Líquido que sale de la vagina, si es eloy.Dolor en la minnie o en la parte baja de la espalda.Otros síntomas pueden incluir los siguientes:  Ganas de devolver (vómitos).No sentir deseos de comer.Sentirse confundido (confuso).Sentirse cansado y malhumorado (irritable).Fiebre.Materia fecal líquida (diarrea).¿Cómo se trata?  El tratamiento de esta afección puede incluir:  Antibióticos. Otros medicamentos.Beber kaz cantidad suficiente de agua.Siga estas indicaciones en bustos casa:     Medicamentos     Wilmar los medicamentos de venta joan y los recetados solamente luana se lo haya indicado el médico.Si le recetaron un antibiótico, tómelo luana se lo haya indicado el médico. No deje de tomarlo aunque comience a sentirse mejor.Indicaciones generales     Asegúrese de hacer lo siguiente:  Edgar pis hasta que la vejiga quede vacía. No contenga el pis michell mucho tiempo.Vacíe la vejiga después de tener relaciones sexuales.Límpiese de adelante hacia atrás después de defecar, si es eloy. Use cada trozo de papel higiénico solo kaz vez cuando se limpie.Elo suficiente líquido luana para mantener la orina de color amarillo pálido.Concurra a todas las visitas de control luana se lo haya indicado el médico. [place] es importante.Comuníquese con un médico si:  No mejora después de 1 o 2 días de tratamiento.Los síntomas desaparecen y luego reaparecen.Solicite ayuda inmediatamente si:  Tiene un dolor muy intenso en la espalda.Tiene dolor muy intenso en la parte baja de la minnie.Tiene fiebre.Tiene malestar estomacal (náuseas).Tiene vómitos.Resumen  Kaz infección urinaria (IU) puede ocurrir en cualquier lugar de las vías urinarias.Esta afección es causada por la presencia de gérmenes en la tabitha genital.Existen muchos factores de riesgo de sufrir kaz IU. Estos incluyen tener colocado un tubo mathis y pequeño para drenar el pis y no poder controlar cuándo hace pis y materia fecal.El tratamiento incluye antibióticos contra los gérmenes.Elo suficiente líquido luana para mantener la orina de color amarillo pálido.Esta información no tiene luana fin reemplazar el consejo del médico. Asegúrese de hacerle al médico cualquier pregunta que tenga.

## 2019-12-15 NOTE — ED STATDOCS - PROGRESS NOTE DETAILS
25 y/o F with no PMH presents with lower abdominal pain x 1 week, HA x 3 days. +dysuria. Denies fever, chills, flank pain/back pain, nausea vomiting. Has had similar symptoms in the past at which time was dx'd as UTI. PE; Well appearing. Cardiac: s1s2, RRR> Lungs: CTAB. Abdomen: NBSx4, soft, +lower abdominal TTP. No CVAT. A/P: R/o UTI. Plan for UCG, UA, UC, reassess. - Otis Seo PA-C UA unremarkable, plan for labs, CTAP. - Otis Seo PA-C

## 2019-12-16 LAB
CULTURE RESULTS: SIGNIFICANT CHANGE UP
SPECIMEN SOURCE: SIGNIFICANT CHANGE UP

## 2020-03-24 ENCOUNTER — RESULT REVIEW (OUTPATIENT)
Age: 25
End: 2020-03-24

## 2020-03-24 ENCOUNTER — OUTPATIENT (OUTPATIENT)
Dept: OUTPATIENT SERVICES | Facility: HOSPITAL | Age: 25
LOS: 1 days | End: 2020-03-24
Payer: SELF-PAY

## 2020-03-24 ENCOUNTER — APPOINTMENT (OUTPATIENT)
Dept: ULTRASOUND IMAGING | Facility: CLINIC | Age: 25
End: 2020-03-24
Payer: SELF-PAY

## 2020-03-24 DIAGNOSIS — Z98.891 HISTORY OF UTERINE SCAR FROM PREVIOUS SURGERY: Chronic | ICD-10-CM

## 2020-03-24 DIAGNOSIS — Z00.8 ENCOUNTER FOR OTHER GENERAL EXAMINATION: ICD-10-CM

## 2020-03-24 PROCEDURE — 76856 US EXAM PELVIC COMPLETE: CPT | Mod: 26

## 2020-03-24 PROCEDURE — 76830 TRANSVAGINAL US NON-OB: CPT

## 2020-03-24 PROCEDURE — 76856 US EXAM PELVIC COMPLETE: CPT

## 2020-03-24 PROCEDURE — 76830 TRANSVAGINAL US NON-OB: CPT | Mod: 26

## 2021-01-29 NOTE — ED ADULT TRIAGE NOTE - BMI (KG/M2)
NUTRITION RISK SCREENING BASED ON A POINT SYSTEM       Recent history of eating disorder     _____ 6 points      Unintended weight loss of 10 pounds in 6 months  _____ 6 points       Decreased appetite for 3 or more days    _____ 2 points      Nausea        _____ 2 points      Vomiting        _____ 2 points     Diarrhea        _____ 2 points     Difficulty Chewing       _____ 2 points      Difficulty Swallowing       _____ 2 points      Scores or > 6 points indicate the need for further nutritional assessment  Staff is to recommend the  patient seek a full assessment from their primary care physician, medical clinic, or other health care  provider  Patient will seek follow up?  Yes [] No [x]    Comments:___No indication for follow up, Zero score ____________________________________  ________________________________________________________________________________  ________________________________________________________________________________  ________________________________________________________________________________  ________________________________________________________________________________ 24.7

## 2021-02-21 ENCOUNTER — EMERGENCY (EMERGENCY)
Facility: HOSPITAL | Age: 26
LOS: 0 days | Discharge: HOME | End: 2021-02-21
Attending: EMERGENCY MEDICINE
Payer: MEDICAID

## 2021-02-21 VITALS
HEART RATE: 68 BPM | SYSTOLIC BLOOD PRESSURE: 108 MMHG | TEMPERATURE: 99 F | OXYGEN SATURATION: 100 % | DIASTOLIC BLOOD PRESSURE: 68 MMHG | RESPIRATION RATE: 18 BRPM

## 2021-02-21 VITALS — WEIGHT: 130.95 LBS | HEIGHT: 61 IN

## 2021-02-21 DIAGNOSIS — M54.5 LOW BACK PAIN: ICD-10-CM
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
## 2021-02-21 DIAGNOSIS — Z98.891 HISTORY OF UTERINE SCAR FROM PREVIOUS SURGERY: Chronic | ICD-10-CM

## 2021-02-21 DIAGNOSIS — R11.0 NAUSEA: ICD-10-CM

## 2021-02-21 DIAGNOSIS — R10.30 LOWER ABDOMINAL PAIN, UNSPECIFIED: ICD-10-CM
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
## 2021-02-21 DIAGNOSIS — M54.9 DORSALGIA, UNSPECIFIED: ICD-10-CM
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
## 2021-02-21 DIAGNOSIS — R10.9 UNSPECIFIED ABDOMINAL PAIN: ICD-10-CM

## 2021-02-21 DIAGNOSIS — Z32.00 ENCOUNTER FOR PREGNANCY TEST, RESULT UNKNOWN: ICD-10-CM

## 2021-02-21 LAB
ALBUMIN SERPL ELPH-MCNC: 3.8 G/DL — SIGNIFICANT CHANGE UP (ref 3.3–5)
ALP SERPL-CCNC: 72 U/L — SIGNIFICANT CHANGE UP (ref 40–120)
ALT FLD-CCNC: 21 U/L — SIGNIFICANT CHANGE UP (ref 12–78)
ANION GAP SERPL CALC-SCNC: 8 MMOL/L — SIGNIFICANT CHANGE UP (ref 5–17)
APPEARANCE UR: CLEAR — SIGNIFICANT CHANGE UP
AST SERPL-CCNC: 17 U/L — SIGNIFICANT CHANGE UP (ref 15–37)
BACTERIA # UR AUTO: ABNORMAL
BASOPHILS # BLD AUTO: 0.04 K/UL — SIGNIFICANT CHANGE UP (ref 0–0.2)
BASOPHILS NFR BLD AUTO: 0.3 % — SIGNIFICANT CHANGE UP (ref 0–2)
BILIRUB SERPL-MCNC: 0.4 MG/DL — SIGNIFICANT CHANGE UP (ref 0.2–1.2)
BILIRUB UR-MCNC: NEGATIVE — SIGNIFICANT CHANGE UP
BUN SERPL-MCNC: 10 MG/DL — SIGNIFICANT CHANGE UP (ref 7–23)
CALCIUM SERPL-MCNC: 8.8 MG/DL — SIGNIFICANT CHANGE UP (ref 8.5–10.1)
CHLORIDE SERPL-SCNC: 110 MMOL/L — HIGH (ref 96–108)
CO2 SERPL-SCNC: 22 MMOL/L — SIGNIFICANT CHANGE UP (ref 22–31)
COLOR SPEC: YELLOW — SIGNIFICANT CHANGE UP
COMMENT - URINE: SIGNIFICANT CHANGE UP
CREAT SERPL-MCNC: 0.7 MG/DL — SIGNIFICANT CHANGE UP (ref 0.5–1.3)
DIFF PNL FLD: ABNORMAL
EOSINOPHIL # BLD AUTO: 0.15 K/UL — SIGNIFICANT CHANGE UP (ref 0–0.5)
EOSINOPHIL NFR BLD AUTO: 1.3 % — SIGNIFICANT CHANGE UP (ref 0–6)
EPI CELLS # UR: SIGNIFICANT CHANGE UP
GLUCOSE SERPL-MCNC: 99 MG/DL — SIGNIFICANT CHANGE UP (ref 70–99)
GLUCOSE UR QL: NEGATIVE MG/DL — SIGNIFICANT CHANGE UP
HCG SERPL-ACNC: 7 MIU/ML — HIGH
HCT VFR BLD CALC: 38.7 % — SIGNIFICANT CHANGE UP (ref 34.5–45)
HGB BLD-MCNC: 13.2 G/DL — SIGNIFICANT CHANGE UP (ref 11.5–15.5)
IMM GRANULOCYTES NFR BLD AUTO: 0.1 % — SIGNIFICANT CHANGE UP (ref 0–1.5)
KETONES UR-MCNC: NEGATIVE — SIGNIFICANT CHANGE UP
LEUKOCYTE ESTERASE UR-ACNC: NEGATIVE — SIGNIFICANT CHANGE UP
LYMPHOCYTES # BLD AUTO: 39.7 % — SIGNIFICANT CHANGE UP (ref 13–44)
LYMPHOCYTES # BLD AUTO: 4.7 K/UL — HIGH (ref 1–3.3)
MCHC RBC-ENTMCNC: 30.3 PG — SIGNIFICANT CHANGE UP (ref 27–34)
MCHC RBC-ENTMCNC: 34.1 GM/DL — SIGNIFICANT CHANGE UP (ref 32–36)
MCV RBC AUTO: 89 FL — SIGNIFICANT CHANGE UP (ref 80–100)
MONOCYTES # BLD AUTO: 0.58 K/UL — SIGNIFICANT CHANGE UP (ref 0–0.9)
MONOCYTES NFR BLD AUTO: 4.9 % — SIGNIFICANT CHANGE UP (ref 2–14)
NEUTROPHILS # BLD AUTO: 6.37 K/UL — SIGNIFICANT CHANGE UP (ref 1.8–7.4)
NEUTROPHILS NFR BLD AUTO: 53.7 % — SIGNIFICANT CHANGE UP (ref 43–77)
NITRITE UR-MCNC: NEGATIVE — SIGNIFICANT CHANGE UP
PH UR: 6.5 — SIGNIFICANT CHANGE UP (ref 5–8)
PLATELET # BLD AUTO: 272 K/UL — SIGNIFICANT CHANGE UP (ref 150–400)
POTASSIUM SERPL-MCNC: 3.4 MMOL/L — LOW (ref 3.5–5.3)
POTASSIUM SERPL-SCNC: 3.4 MMOL/L — LOW (ref 3.5–5.3)
PROT SERPL-MCNC: 7.2 GM/DL — SIGNIFICANT CHANGE UP (ref 6–8.3)
PROT UR-MCNC: NEGATIVE MG/DL — SIGNIFICANT CHANGE UP
RBC # BLD: 4.35 M/UL — SIGNIFICANT CHANGE UP (ref 3.8–5.2)
RBC # FLD: 11.9 % — SIGNIFICANT CHANGE UP (ref 10.3–14.5)
RBC CASTS # UR COMP ASSIST: ABNORMAL /HPF (ref 0–4)
SODIUM SERPL-SCNC: 140 MMOL/L — SIGNIFICANT CHANGE UP (ref 135–145)
SP GR SPEC: 1.01 — SIGNIFICANT CHANGE UP (ref 1.01–1.02)
UROBILINOGEN FLD QL: NEGATIVE MG/DL — SIGNIFICANT CHANGE UP
WBC # BLD: 11.85 K/UL — HIGH (ref 3.8–10.5)
WBC # FLD AUTO: 11.85 K/UL — HIGH (ref 3.8–10.5)
WBC UR QL: SIGNIFICANT CHANGE UP

## 2021-02-21 PROCEDURE — 80053 COMPREHEN METABOLIC PANEL: CPT

## 2021-02-21 PROCEDURE — 81025 URINE PREGNANCY TEST: CPT

## 2021-02-21 PROCEDURE — 76856 US EXAM PELVIC COMPLETE: CPT | Mod: 26

## 2021-02-21 PROCEDURE — 85025 COMPLETE CBC W/AUTO DIFF WBC: CPT

## 2021-02-21 PROCEDURE — 87077 CULTURE AEROBIC IDENTIFY: CPT

## 2021-02-21 PROCEDURE — 84702 CHORIONIC GONADOTROPIN TEST: CPT

## 2021-02-21 PROCEDURE — 96374 THER/PROPH/DIAG INJ IV PUSH: CPT

## 2021-02-21 PROCEDURE — 81001 URINALYSIS AUTO W/SCOPE: CPT

## 2021-02-21 PROCEDURE — 36415 COLL VENOUS BLD VENIPUNCTURE: CPT

## 2021-02-21 PROCEDURE — 87086 URINE CULTURE/COLONY COUNT: CPT

## 2021-02-21 PROCEDURE — 76856 US EXAM PELVIC COMPLETE: CPT

## 2021-02-21 PROCEDURE — 99284 EMERGENCY DEPT VISIT MOD MDM: CPT

## 2021-02-21 PROCEDURE — 99284 EMERGENCY DEPT VISIT MOD MDM: CPT | Mod: 25

## 2021-02-21 RX ORDER — KETOROLAC TROMETHAMINE 30 MG/ML
15 SYRINGE (ML) INJECTION ONCE
Refills: 0 | Status: DISCONTINUED | OUTPATIENT
Start: 2021-02-21 | End: 2021-02-21

## 2021-02-21 RX ADMIN — Medication 15 MILLIGRAM(S): at 21:57

## 2021-02-21 NOTE — ED STATDOCS - NSFOLLOWUPCLINICS_GEN_ALL_ED_FT
[facility]  Family Medicine  [address] [place] [address]  Phone: [phone]  Fax:   Follow Up Time: 1-3 Days

## 2021-02-21 NOTE — ED ADULT TRIAGE NOTE - CHIEF COMPLAINT QUOTE
Ambulatory complaining of generalized abdominal pain that sometimes radiates to her back since yesterday evening. LMP 1/2/21, took a pregnancy test yesterday that was "blurry," so unsure of the result. Denies N/V/D, CP, urinary complications.

## 2021-02-21 NOTE — ED ADULT NURSE NOTE - OBJECTIVE STATEMENT
Pt c/o generalized abdominal pain x weeks worsening over last few days.  Pt states "im unsure if I am pregnant".  LMP 1/2/21. States took a pregnancy test but unsure what it said.  PT c/o mild nausea .  Denies fever or  diarrhea

## 2021-02-21 NOTE — ED STATDOCS - CARE PLAN
Principal Discharge DX:	Lower abdominal pain  Secondary Diagnosis:	Low back pain, unspecified back pain laterality, unspecified chronicity, unspecified whether sciatica present  Secondary Diagnosis:	Elevated serum hCG

## 2021-02-21 NOTE — ED STATDOCS - PATIENT PORTAL LINK FT
You can access the FollowMyHealth Patient Portal offered by [facility] by registering at the following website: [email or web address]. By joining [email or web address]’s FollowMyHealth portal, you will also be able to view your health information using other applications (apps) compatible with our system.

## 2021-02-21 NOTE — ED STATDOCS - NSFOLLOWUPINSTRUCTIONS_ED_ALL_ED_FT
Tiene un nivel elevado de HCG en luis. No está briseida si esto indica un embarazo muy temprano. Bustos ecografía es negativa para embarazo. Regrese aquí o en la clínica estela en 48 horas para repetir bustos nivel de HCG. Regrese si experimenta un empeoramiento del dolor.    Distensión lumbar  Lumbar Strain  Kaz distensión lumbar, a veces llamada distensión de la parte baja de la espalda, es un estiramiento o un desgarro en un músculo o en los cordones tashia de tejido que unen el músculo al hueso (tendones) en la parte inferior de la espalda (columna lumbar). Liz tipo de lesión ocurre cuando los músculos o los tendones se desgarran o se estiran más allá de bustos límite.  Las distensiones lumbares puede ser de leves a graves. Las distensiones leves pueden involucrar el estiramiento de un músculo o un tendón sin desgarrarlo. Estas pueden curarse en 1 o 2 semanas. Las distensiones más graves involucran el desgarro de fibras o tendones musculares. Estas causarán más dolor y pueden demorar entre 6 y 8 semanas en curarse.  ¿Cuáles son las causas?  Esta afección puede ser causada por lo siguiente:  Traumatismo, debido, por ejemplo, a kaz caída o a un golpe en el cuerpo.Torsión o hiperdistensión de la espalda. [place] puede ser el resultado de realizar actividades que requieren mucha energía, luana levantar objetos pesados.¿Qué incrementa el riesgo?  Esta lesión es más frecuente en las siguientes personas:  Atletas.Personas con obesidad.Personas que hacen movimientos repetitivos de levantar, inclinarse u otros movimientos que involucran la espalda.¿Cuáles son los signos o los síntomas?  Los síntomas de esta afección pueden incluir los siguientes:  Dolor alejandra o sordo en la parte inferior de la espalda que no desaparece. El dolor se puede extender hacia las nalgas.Rigidez o amplitud de movimientos limitada.Tiene contracciones musculares súbitas (espasmos).¿Cómo se diagnostica?  Esta afección se puede diagnosticar en función de lo siguiente:  Elizaebth síntomas.Elizabeth antecedentes médicos.Un examen físico.Pruebas de diagnóstico por imágenes, por ejemplo:  Radiografías.Resonancia magnética (RM).¿Cómo se trata?  El tratamiento de esta afección puede incluir lo siguiente:  Reposo.Aplicar calor y frío en la tabitha afectada.Medicamentos de venta joan para aliviar el dolor y la inflamación, luana antiinflamatorios no esteroideos (ELIZABETH).Los analgésicos recetados y los relajantes musculares pueden ser necesarios michell un corto tiempo.Realizar fisioterapia.Siga estas instrucciones en bustos casa:  Control del dolor, el entumecimiento y la hinchazón               Si se lo indican, aplique hielo en la tabitha lesionada michell las primeras 24 horas después de producida la lesión.  Ponga el hielo en kaz bolsa plástica.Coloque kaz toalla entre la piel y la bolsa.Coloque el hielo michell 20 minutos, 2 a 3 veces por día.Si se lo indican, aplique calor en la tabitha afectada con la frecuencia que le haya indicado el médico. Use la kodak de calor que el médico le recomiende, luana kaz compresa de calor húmedo o kaz almohadilla térmica.  Coloque kaz toalla entre la piel y la kodak de calor.Aplique calor michell 20 a 30 minutos.Retire la kodak de calor si la piel se pone de color wolff brillante. [place] es especialmente importante si no puede sentir dolor, calor o frío. Puede correr un riesgo mayor de sufrir quemaduras.Actividad     Descanse y retome elizabeth actividades normales luana se lo haya indicado el médico. Pregúntele al médico qué actividades son seguras para usted.Donn ejercicio luana se lo haya indicado el médico.Medicamentos     Alcolu los medicamentos de venta joan y los recetados solamente luana se lo haya indicado el médico.Pregúntele al médico si el medicamento recetado:  Hace que sea necesario que evite conducir o usar maquinaria pesada.Puede causarle estreñimiento. Es posible que tenga que isidro estas medidas para prevenir o tratar el estreñimiento:  Beber suficiente líquido luana para mantener la orina de color amarillo pálido.Isidro medicamentos recetados o de venta joan.Consumir alimentos ricos en fibra, luana frijoles, cereales integrales, y frutas y verduras frescas.Limitar bustos consumo de alimentos ricos en grasa y azúcares procesados, luana los alimentos fritos o dulces.Prevención de lesiones     Para prevenir kaz lesión futura en la parte baja de la espalda:  Siempre donn un precalentamiento adecuado antes de la actividad física o de practicar deportes.Relájese y elongue después de hacer actividad física.Practique deportes y levante objetos pesados de forma correcta. Flexione las rodillas antes de levantar objetos pesados.Adopte kaz buena postura mientras esté sentado y de pie.Manténgase en buen estado físico y con un peso saludable.  Donn por lo menos 150 minutos de ejercicios de intensidad moderada cada semana, luana caminar a paso ligero o hacer gimnasia acuática.Donn ejercicios de fuerza al menos 2 veces por semana.Indicaciones generales     No consuma ningún producto que contenga nicotina o tabaco, luana cigarrillos, cigarrillos electrónicos y tabaco de mascar. Si necesita ayuda para dejar de consumir, consulte al médico.Concurra a todas las visitas de control luana se lo haya indicado el médico. [place] es importante.Comuníquese con un médico si:  El dolor de espalda no mejora después de 6 semanas de tratamiento.Elizabeth síntomas empeoran.Solicite ayuda inmediatamente si:  Tiene un nivel elevado de HCG en luis. No está briseida si esto indica un embarazo muy temprano. Bustos ecografía es negativa para embarazo. Regrese aquí o en la clínica estela en 48 horas para repetir bustos nivel de HCG. Regrese si experimenta un empeoramiento del dolor.    El dolor de espalda es muy intenso.Nota que no puede pararse o caminar.Siente dolor en las piernas.Siente debilidad en las nalgas o en las piernas.Tiene dificultad para controlar la micción o los movimientos intestinales.  Tiene micción frecuente, dolorosa o con luis.Tiene kaz temperatura por encima de 101.0 °F (38.3 °C)Resumen  Kaz distensión lumbar, a veces llamada distensión de la parte baja de la espalda, es un estiramiento o un desgarro en un músculo o en los cordones tashia de tejido que unen el músculo al hueso (tendones) en la parte inferior de la espalda (columna lumbar).Liz tipo de lesión ocurre cuando los músculos o los tendones se desgarran o se estiran más allá de bustos límite.Descanse y retome elizabeth actividades normales luana se lo haya indicado el médico. Si se lo indican, aplique calor y hielo en la tabitha afectada con la frecuencia que le haya indicado el médico.Alcolu los medicamentos de venta joan y los recetados solamente luana se lo haya indicado el médico.Comuníquese con un médico si tiene síntomas nuevos o los síntomas empeoran.Esta información no tiene luana fin reemplazar el consejo del médico. Asegúrese de hacerle al médico cualquier pregunta que tenga.      Dolor abdominal en adultos  Abdominal Pain, Adult  El dolor abdominal puede tener muchas causas. A menudo, no es grave y mejora sin tratamiento o con tratamiento en la casa. Sin embargo, a veces el dolor abdominal es intenso. El médico revisará elizabeth antecedentes médicos y le hará un examen físico para tratar de determinar la causa del dolor abdominal.  Siga estas instrucciones en bustos casa:  Alcolu los medicamentos de venta joan y los recetados solamente luana se lo haya indicado el médico. No tome un laxante a menos que se lo haya indicado el médico.Elo suficiente líquido para mantener la orina chance o de color amarillo pálido.Controle bustos afección para soraya si hay cambios.Concurra a todas las visitas de control luana se lo haya indicado el médico. [place] es importante.Comuníquese con un médico si:  El dolor abdominal cambia o empeora.No tiene apetito o baja de peso sin proponérselo.Está estreñido o tiene diarrea michell más de 2 o 3 días.Tiene dolor cuando orina o defeca.El dolor abdominal lo despierta de noche.El dolor empeora con las comidas, después de comer o con determinados alimentos.Tiene vómitos y no puede retener nada.Tiene fiebre.Solicite ayuda de inmediato si:  El dolor no desaparece tan pronto luana el médico le dijo que era esperable.No puede detener los vómitos.El dolor se siente solo en zonas del abdomen, luana el lado derecho o la parte inferior izquierda del abdomen.Las heces son sanguinolentas o de color jody, o de aspecto alquitranado.Tiene dolor intenso, cólicos, o meteorismo en el abdomen.Tiene signos de deshidratación, por ejemplo:  Orina oscura, muy escasa o falta de orina.Labios agrietados.[place]oca seca.Ojos hundidos.Somnolencia.Debilidad.Esta información no tiene luana fin reemplazar el consejo del médico. Asegúrese de hacerle al médico cualquier pregunta que tenga. Tiene un nivel elevado de HCG en luis. No está briseida si esto indica un embarazo muy temprano. Portillo ecografía es negativa para embarazo. Regrese aquí o en la clínica estela en 48 horas para repetir portillo nivel de HCG. Regrese si experimenta un empeoramiento del dolor.        Embarazo ectópico    (Ectopic Pregnancy)      Un embarazo ectópico ocurre cuando el óvulo fertilizado se fija (implanta) fuera del útero. La mayoría de los embarazos ectópicos ocurren en simona de los tubos por donde viajan los óvulos desde el ovario al útero (trompas de Falopio), devaughn la implantación puede ocurrir en otros lugares. En casos poco frecuentes, ocurren en el ovario, el intestino, la pelvis, el abdomen o el lizeth del útero. En un embarazo ectópico, el óvulo fertilizado no tiene la capacidad de llegar a ser un bebé normal y cj.    Un embarazo ectópico con ruptura se produce cuando kaz trompa de Falopio se rompe o estalla y provoca kaz hemorragia interna. Suele dontrell dolor intenso en la parte inferior del abdomen, y, en ocasiones, hay hemorragia vaginal. Tener un embarazo ectópico puede ser kaz experiencia que pone en peligro la jose. Si esta peligrosa afección no se trata, puede producir pérdida de luis, shock o incluso la muerte.      CAUSAS    La causa más frecuente de esta afección es un daño en kaz de las trompas de Falopio. Kaz trompa de Falopio puede estar obstruida o estrecha, y esto impide que el óvulo fertilizado llegue al útero.      FACTORES DE RIESGO    Es más probable que esta afección se manifieste en mujeres que están en edad fértil y tienen diferentes niveles de riesgo. Los niveles de riesgo pueden dividirse en kenisha categorías.    [place] riesgo     •Usted quintana realizado tratamientos para la infertilidad.      •Ha tenido un embarazo ectópico antes.      •Se sometió a kaz cirugía en las trompas de Falopio u otro procedimiento quirúrgico, luana un aborto.      •Tuvo kaz cirugía para ligar las trompas de Falopio (ligadura de trompas).      •Tiene algún problema o enfermedad en las trompas de Falopio.      •Estuvo expuesta a dietiletilbestrol (EDITH). Liz es un medicamento que se utilizó hasta 1971 y tuvo efectos en los bebés cuyas madres lo tomaron.      •Queda embarazada mientras usa un DIU (dispositivo intrauterino) luana método anticonceptivo.      Riesgo moderado     •Tiene antecedentes de infertilidad.      •Tuvo kaz ITS (infección de transmisión sexual).      •Tiene antecedentes de enfermedad pélvica inflamatoria (EPI).      •Tiene cicatrices por endometriosis.      •Tiene múltiples parejas sexuales.      •Fuma.      [place] riesgo     •Se sometió a kaz cirugía pélvica.      •Usa duchas vaginales.      •Comenzó a ser sexualmente activa antes de los 18 años de edad.        SÍNTOMAS    Los síntomas frecuentes de esta afección incluyen los síntomas normales del embarazo, luana pérdida del período menstrual, náuseas, cansancio, dolor abdominal, dolor en las mamas a la palpación y hemorragia. No obstante, el embarazo ectópico tiene síntomas adicionales, luana los siguientes:    •Dolor michell las relaciones sexuales.      •Hemorragia vaginal o manchado irregular.      •Cólicos o dolor en simona de los lados o en la tabitha inferior del abdomen.      •Frecuencia cardíaca rápida, baja presión arterial y sudoración.      •Desmayarse al defecar.      Los síntomas de un embarazo ectópico con ruptura y hemorragias internas son:    •Dolor intenso y súbito en el abdomen y la pelvis.      •Mareos, debilidad, sensación de desvanecimiento o desmayos.      •Dolor en la tabitha del hombro o el lizeth.        DIAGNÓSTICO    Esta afección se diagnostica mediante:    •Examen pélvico para localizar el dolor o un bulto en el abdomen.      •Test de embarazo. Liz análisis de luis se realiza para detectar la presencia y el nivel específico de la hormona del embarazo en el torrente circulatorio.      •Ecografía. [place] se realiza cuando kaz prueba de embarazo es positiva. En esta prueba, se introduce kaz sonda en la vagina. La sonda detecta el feto, posiblemente en un lugar que no es el útero.      •Extracción de kaz muestra de tejido del útero (dilatación y curetaje o D y C).      •Cirugía para realizar un examen visual del interior del abdomen usando un tubo mathis, que emite griffin y tiene kaz pequeña cámara en el extremo (laparoscopio).      •Culdocentesis. En liz procedimiento, se inserta kaz aguja en la parte superior de la vagina, detrás del útero. Si hay luis en esta tabitha, esto puede indicar que kaz trompa de Falopio está rota.        TRATAMIENTO    Esta afección se trata con medicamentos o con cirugía.    Medicamentos   •Le pueden administrar kaz inyección de un medicamento (metotrexato) para hacer que el tejido del embarazo se absorba. Liz medicamento puede salvar la trompa de Falopio. Puede administrarse si:  •El diagnóstico se realiza de forma temprana, sin signos de hemorragia activa.      •La trompa de Falopio no se ha roto.      •Se la considera kaz buena candidata para recibir el medicamento.        Por lo general, después del tratamiento con metotrexato se comprueban los niveles de hormonas del embarazo. [place] se hace para asegurarse de que el medicamento es efectivo. El embarazo se absorbe después de 4 a 6 semanas. La mayoría de los embarazos se absorbe antes de las 3 semanas.    Cirugía     •Se puede utilizar un laparoscopio para retirar el tejido del embarazo.      •Si ocurre kaz hemorragia interna grave, se puede realizar un luiza (incisión) más sue en la parte inferior del abdomen (laparotomía) para extraer el feto y la placenta. [place] se hace para detener la hemorragia.      •Se puede extraer kaz parte de la trompa de Falopio o toda (salpingectomía) junto con el feto y la placenta. La trompa de Falopio también se puede reparar michell la cirugía.      •En circunstancias muy poco frecuentes, puede ser necesario extraer el útero (histerectomía).      •Luego de la cirugía, pueden analizar la hormona del embarazo para asegurarse de que no quedan tejidos.      Ya sea que el tratamiento consista en medicamentos o en kaz cirugía, es posible que reciba kaz inyección de inmunoglobulina Rho (D) para prevenir problemas con un embarazo futuro. Esta inyección se puede administrar si:    •Tiene luis Rh negativa y el padre del bebé tiene luis Rh positiva.      •Tiene luis Rh negativa y no sabe el tipo de Rh del padre del bebé.        INSTRUCCIONES PARA EL CUIDADO EN EL HOGAR    •Donn reposo y limite portillo actividad después del procedimiento michell el tiempo que le indique el médico.    •Hasta que el médico lo autorice:  •No levante objetos que pesen más de 10 libras (4,5 kg) o el límite que le indique el médico.      •Evite el ejercicio físico y cualquier movimiento que requiera esfuerzo (que sea extenuante).      •Para ayudar a evitar el estreñimiento:  •Consuma kaz dieta saludable que incluya frutas, verduras y cereales integrales.      •Elo entre 6 y 8 vasos de agua por día.          SOLICITE ATENCIÓN MÉDICA DE INMEDIATO SI:    •El dolor empeora y no se jessica con el medicamento.    •Tiene los siguientes síntomas:  •Fiebre o escalofríos.      •Hemorragia vaginal.      •Enrojecimiento e hinchazón en el lugar de la incisión.      •Náuseas y vómitos.        •Siente que se desvanece o está débil.      •Se siente mareada o se desmaya.      Esta información no tiene luana fin reemplazar el consejo del médico. Asegúrese de hacerle al médico cualquier pregunta que tenga.      Document Revised: 11/07/2017 Document Reviewed: 07/19/2017    Elsevier Patient Education © 2020 [employer]      Distensión lumbar  Lumbar Strain  Kaz distensión lumbar, a veces llamada distensión de la parte baja de la espalda, es un estiramiento o un desgarro en un músculo o en los cordones tashia de tejido que unen el músculo al hueso (tendones) en la parte inferior de la espalda (columna lumbar). Liz tipo de lesión ocurre cuando los músculos o los tendones se desgarran o se estiran más allá de portillo límite.  Las distensiones lumbares puede ser de leves a graves. Las distensiones leves pueden involucrar el estiramiento de un músculo o un tendón sin desgarrarlo. Estas pueden curarse en 1 o 2 semanas. Las distensiones más graves involucran el desgarro de fibras o tendones musculares. Estas causarán más dolor y pueden demorar entre 6 y 8 semanas en curarse.  ¿Cuáles son las causas?  Esta afección puede ser causada por lo siguiente:  Traumatismo, debido, por ejemplo, a kaz caída o a un golpe en el cuerpo.Torsión o hiperdistensión de la espalda. [place] puede ser el resultado de realizar actividades que requieren mucha energía, luana levantar objetos pesados.¿Qué incrementa el riesgo?  Esta lesión es más frecuente en las siguientes personas:  Atletas.Personas con obesidad.Personas que hacen movimientos repetitivos de levantar, inclinarse u otros movimientos que involucran la espalda.¿Cuáles son los signos o los síntomas?  Los síntomas de esta afección pueden incluir los siguientes:  Dolor alejandra o sordo en la parte inferior de la espalda que no desaparece. El dolor se puede extender hacia las nalgas.Rigidez o amplitud de movimientos limitada.Tiene contracciones musculares súbitas (espasmos).¿Cómo se diagnostica?  Esta afección se puede diagnosticar en función de lo siguiente:  Elizabeth síntomas.Elizabeth antecedentes médicos.Un examen físico.Pruebas de diagnóstico por imágenes, por ejemplo:  Radiografías.Resonancia magnética (RM).¿Cómo se trata?  El tratamiento de esta afección puede incluir lo siguiente:  Reposo.Aplicar calor y frío en la tabitha afectada.Medicamentos de venta joan para aliviar el dolor y la inflamación, luana antiinflamatorios no esteroideos (ELIZABETH).Los analgésicos recetados y los relajantes musculares pueden ser necesarios michell un corto tiempo.Realizar fisioterapia.Siga estas instrucciones en portillo casa:  Control del dolor, el entumecimiento y la hinchazón               Si se lo indican, aplique hielo en la tabitha lesionada michell las primeras 24 horas después de producida la lesión.  Ponga el hielo en kaz bolsa plástica.Coloque kaz toalla entre la piel y la bolsa.Coloque el hielo michell 20 minutos, 2 a 3 veces por día.Si se lo indican, aplique calor en la tabitha afectada con la frecuencia que le haya indicado el médico. Use la kodak de calor que el médico le recomiende, luana kaz compresa de calor húmedo o kaz almohadilla térmica.  Coloque kaz toalla entre la piel y la kodak de calor.Aplique calor michell 20 a 30 minutos.Retire la kodak de calor si la piel se pone de color wolff brillante. [place] es especialmente importante si no puede sentir dolor, calor o frío. Puede correr un riesgo mayor de sufrir quemaduras.Actividad     Descanse y retome elizabeth actividades normales luana se lo haya indicado el médico. Pregúntele al médico qué actividades son seguras para usted.Donn ejercicio luana se lo haya indicado el médico.Medicamentos     [place] los medicamentos de venta joan y los recetados solamente luana se lo haya indicado el médico.Pregúntele al médico si el medicamento recetado:  Hace que sea necesario que evite conducir o usar maquinaria pesada.Puede causarle estreñimiento. Es posible que tenga que isidro estas medidas para prevenir o tratar el estreñimiento:  Beber suficiente líquido luana para mantener la orina de color amarillo pálido.Isidro medicamentos recetados o de venta joan.Consumir alimentos ricos en fibra, luana frijoles, cereales integrales, y frutas y verduras frescas.Limitar portillo consumo de alimentos ricos en grasa y azúcares procesados, luana los alimentos fritos o dulces.Prevención de lesiones     Para prevenir kaz lesión futura en la parte baja de la espalda:  Siempre donn un precalentamiento adecuado antes de la actividad física o de practicar deportes.Relájese y elongue después de hacer actividad física.Practique deportes y levante objetos pesados de forma correcta. Flexione las rodillas antes de levantar objetos pesados.Adopte kaz buena postura mientras esté sentado y de pie.Manténgase en buen estado físico y con un peso saludable.  Donn por lo menos 150 minutos de ejercicios de intensidad moderada cada semana, luana caminar a paso ligero o hacer gimnasia acuática.Donn ejercicios de fuerza al menos 2 veces por semana.Indicaciones generales     No consuma ningún producto que contenga nicotina o tabaco, luana cigarrillos, cigarrillos electrónicos y tabaco de mascar. Si necesita ayuda para dejar de consumir, consulte al médico.Concurra a todas las visitas de control luana se lo haya indicado el médico. [place] es importante.Comuníquese con un médico si:  El dolor de espalda no mejora después de 6 semanas de tratamiento.Elizabeth síntomas empeoran.Solicite ayuda inmediatamente si:  Tiene un nivel elevado de HCG en luis. No está briseida si esto indica un embarazo muy temprano. Portillo ecografía es negativa para embarazo. Regrese aquí o en la clínica estela en 48 horas para repetir portillo nivel de HCG. Regrese si experimenta un empeoramiento del dolor.    El dolor de espalda es muy intenso.Nota que no puede pararse o caminar.Siente dolor en las piernas.Siente debilidad en las nalgas o en las piernas.Tiene dificultad para controlar la micción o los movimientos intestinales.  Tiene micción frecuente, dolorosa o con luis.Tiene kaz temperatura por encima de 101.0 °F (38.3 °C)Resumen  Kaz distensión lumbar, a veces llamada distensión de la parte baja de la espalda, es un estiramiento o un desgarro en un músculo o en los cordones tashia de tejido que unen el músculo al hueso (tendones) en la parte inferior de la espalda (columna lumbar).Liz tipo de lesión ocurre cuando los músculos o los tendones se desgarran o se estiran más allá de portillo límite.Descanse y retome elizabeth actividades normales luana se lo haya indicado el médico. Si se lo indican, aplique calor y hielo en la tabitha afectada con la frecuencia que le haya indicado el médico.[place] los medicamentos de venta joan y los recetados solamente luana se lo haya indicado el médico.Comuníquese con un médico si tiene síntomas nuevos o los síntomas empeoran.Esta información no tiene luana fin reemplazar el consejo del médico. Asegúrese de hacerle al médico cualquier pregunta que tenga.      Dolor abdominal en adultos  Abdominal Pain, Adult  El dolor abdominal puede tener muchas causas. A menudo, no es grave y mejora sin tratamiento o con tratamiento en la casa. Sin embargo, a veces el dolor abdominal es intenso. El médico revisará elizabeth antecedentes médicos y le hará un examen físico para tratar de determinar la causa del dolor abdominal.  Siga estas instrucciones en portillo casa:  Lindy los medicamentos de venta joan y los recetados solamente luana se lo haya indicado el médico. No tome un laxante a menos que se lo haya indicado el médico.Elo suficiente líquido para mantener la orina chance o de color amarillo pálido.Controle portillo afección para soraya si hay cambios.Concurra a todas las visitas de control luana se lo haya indicado el médico. [place] es importante.Comuníquese con un médico si:  El dolor abdominal cambia o empeora.No tiene apetito o baja de peso sin proponérselo.Está estreñido o tiene diarrea michell más de 2 o 3 días.Tiene dolor cuando orina o defeca.El dolor abdominal lo despierta de noche.El dolor empeora con las comidas, después de comer o con determinados alimentos.Tiene vómitos y no puede retener nada.Tiene fiebre.Solicite ayuda de inmediato si:  El dolor no desaparece tan pronto luana el médico le dijo que era esperable.No puede detener los vómitos.El dolor se siente solo en zonas del abdomen, luana el lado derecho o la parte inferior izquierda del abdomen.Las heces son sanguinolentas o de color jody, o de aspecto alquitranado.Tiene dolor intenso, cólicos, o meteorismo en el abdomen.Tiene signos de deshidratación, por ejemplo:  Orina oscura, muy escasa o falta de orina.Labios agrietados.Boca seca.Ojos hundidos.Somnolencia.Debilidad.Esta información no tiene luana fin reemplazar el consejo del médico. Asegúrese de hacerle al médico cualquier pregunta que tenga.

## 2021-02-21 NOTE — ED STATDOCS - PROGRESS NOTE DETAILS
26 y/o F with no PMH presents with back and abdominal pain after lifting boxes at work. Of note, patient reports LMP as 1/2/21. States she had "blurry" pregnancy test at home. Pain not changed with PO medications at home. Denies urinary symptoms, vaginal bleeding, fever, chills. +nausea w/out vomiting. PE: Well appearing. Cardiac: s1s2, RRR. Lungs: CTAB. Abdomen: NBS x4, soft, +mild lower abdominal TTP. A/P: back pain, unknown pregnancy status. If positive, will evaluate for ectopic pregnancy. labs, analgesia, IVF, US transvaginal. - Otis Seo PA-C Less suspicious that minimally elevated hcg represents pregnancy.  Will check repeat beta in 2 days for re-eval, but will need outpatient ob f/u in any event.  Pt otherwise well appearing.  D/c home with strict return precautions and prompt outpatient f/u.

## 2021-02-21 NOTE — ED STATDOCS - SECONDARY DIAGNOSIS.
0 Low back pain, unspecified back pain laterality, unspecified chronicity, unspecified whether sciatica present Elevated serum hCG

## 2021-02-21 NOTE — ED STATDOCS - OBJECTIVE STATEMENT
26 y/o female with no pertinent PMHx presents to ED c/o abdominal pain radiating into her back intermittently. Pt took a home pregnancy test and she wasn't sure of the result. Pt was at work yesterday and was carrying some heavy boxes and had onset of abdominal pain and back pain. Pt woke up this morning, had continued abdominal pain and back pain. LMP: 1/02/21. Denies dysuria. States there are certain smells that make her nauseous.

## 2021-02-23 ENCOUNTER — EMERGENCY (EMERGENCY)
Facility: HOSPITAL | Age: 26
LOS: 0 days | Discharge: HOME | End: 2021-02-23
Attending: EMERGENCY MEDICINE
Payer: MEDICAID

## 2021-02-23 VITALS
TEMPERATURE: 99 F | SYSTOLIC BLOOD PRESSURE: 117 MMHG | HEART RATE: 68 BPM | DIASTOLIC BLOOD PRESSURE: 74 MMHG | OXYGEN SATURATION: 100 % | RESPIRATION RATE: 16 BRPM

## 2021-02-23 VITALS — WEIGHT: 130.95 LBS | HEIGHT: 61 IN

## 2021-02-23 DIAGNOSIS — O02.1 MISSED ABORTION: ICD-10-CM

## 2021-02-23 DIAGNOSIS — R10.9 UNSPECIFIED ABDOMINAL PAIN: ICD-10-CM

## 2021-02-23 DIAGNOSIS — Z98.891 HISTORY OF UTERINE SCAR FROM PREVIOUS SURGERY: Chronic | ICD-10-CM

## 2021-02-23 DIAGNOSIS — Z3A.08 8 WEEKS GESTATION OF PREGNANCY: ICD-10-CM

## 2021-02-23 LAB
CULTURE RESULTS: SIGNIFICANT CHANGE UP
HCG SERPL-ACNC: 5 MIU/ML — HIGH
SPECIMEN SOURCE: SIGNIFICANT CHANGE UP

## 2021-02-23 PROCEDURE — 84702 CHORIONIC GONADOTROPIN TEST: CPT

## 2021-02-23 PROCEDURE — 36415 COLL VENOUS BLD VENIPUNCTURE: CPT

## 2021-02-23 PROCEDURE — 99283 EMERGENCY DEPT VISIT LOW MDM: CPT

## 2021-02-23 NOTE — ED ADULT NURSE NOTE - CHIEF COMPLAINT QUOTE
Pt was told to return to evaluate for possible ectopic pregnancy.  Pt was evaluated three days ago in  ED.  Pt reports that she would be approx 7-8 weeks pregnant.

## 2021-02-23 NOTE — ED ADULT TRIAGE NOTE - CHIEF COMPLAINT QUOTE
Pt was told to return to evaluate for possible ectopic pregnancy.  Pt was evaluated three days ago in  ED. Pt was told to return to evaluate for possible ectopic pregnancy.  Pt was evaluated three days ago in  ED.  Pt reports that she would be approx 7-8 weeks pregnant.

## 2021-02-23 NOTE — ED STATDOCS - PATIENT PORTAL LINK FT
You can access the FollowMyHealth Patient Portal offered by [facility] by registering at the following website: [email or web address]. By joining [employer]’s FollowMyHealth portal, you will also be able to view your health information using other applications (apps) compatible with our system.

## 2021-02-23 NOTE — ED ADULT NURSE NOTE - OBJECTIVE STATEMENT
pt presents to ed ambulatory for repeat beta HCG. pt was seen in ED 2 days with elevatd hcg and no visualization in sono. pt vitals stable, in no distress, no other complaints

## 2021-02-23 NOTE — ED STATDOCS - OBJECTIVE STATEMENT
24 y/o F  with PMHx of s/p  presents to the ED for repeat HCG. Pt was seen in ED on  for abd pain, was found to have elevated HCG however no IUP visualized on sono, was d/c to f/u in 48 hours for repeat HCG. Reports +abd pain is still present but improved from 2 days ago. No fever. Denies hx of STI. LMP: 2021. NKDA. PCP: Dr. Mesa Co. Pt is Salv[place] speaking, [place]  used, ID#: [id number]

## 2021-02-23 NOTE — ED STATDOCS - PROGRESS NOTE DETAILS
patient initially seen and evaluated with ED attending at intake.  reporting she came back to have hormone level repeated as she had some abdominal pain on Sunday and had beta of 7.  TOday her pain is better.   ID [id number] used to review results with patient.  As beta decreased today, low suspicion of ectopic at this time but strongly urged patient to closely follow up with ob/gyn and reviewed strict return precautions -Mary James PA-C

## 2021-02-23 NOTE — ED STATDOCS - NSFOLLOWUPCLINICS_GEN_ALL_ED_FT
[facility]  Family Medicine  [address] [place] [address]  Phone: [phone]  Fax:   Follow Up Time:

## 2021-02-25 ENCOUNTER — EMERGENCY (EMERGENCY)
Facility: HOSPITAL | Age: 26
LOS: 0 days | Discharge: HOME | End: 2021-02-26
Attending: EMERGENCY MEDICINE
Payer: MEDICAID

## 2021-02-25 VITALS — HEIGHT: 61 IN | WEIGHT: 130.07 LBS

## 2021-02-25 DIAGNOSIS — Z98.891 HISTORY OF UTERINE SCAR FROM PREVIOUS SURGERY: Chronic | ICD-10-CM

## 2021-02-25 DIAGNOSIS — N93.9 ABNORMAL UTERINE AND VAGINAL BLEEDING, UNSPECIFIED: ICD-10-CM

## 2021-02-25 DIAGNOSIS — R10.9 UNSPECIFIED ABDOMINAL PAIN: ICD-10-CM

## 2021-02-25 DIAGNOSIS — O03.9 COMPLETE OR UNSPECIFIED SPONTANEOUS ABORTION WITHOUT COMPLICATION: ICD-10-CM

## 2021-02-25 LAB
ALBUMIN SERPL ELPH-MCNC: 4.1 G/DL — SIGNIFICANT CHANGE UP (ref 3.3–5)
ALP SERPL-CCNC: 84 U/L — SIGNIFICANT CHANGE UP (ref 40–120)
ALT FLD-CCNC: 23 U/L — SIGNIFICANT CHANGE UP (ref 12–78)
ANION GAP SERPL CALC-SCNC: 5 MMOL/L — SIGNIFICANT CHANGE UP (ref 5–17)
AST SERPL-CCNC: 13 U/L — LOW (ref 15–37)
BASOPHILS # BLD AUTO: 0.04 K/UL — SIGNIFICANT CHANGE UP (ref 0–0.2)
BASOPHILS NFR BLD AUTO: 0.4 % — SIGNIFICANT CHANGE UP (ref 0–2)
BILIRUB SERPL-MCNC: 0.2 MG/DL — SIGNIFICANT CHANGE UP (ref 0.2–1.2)
BUN SERPL-MCNC: 7 MG/DL — SIGNIFICANT CHANGE UP (ref 7–23)
CALCIUM SERPL-MCNC: 9.3 MG/DL — SIGNIFICANT CHANGE UP (ref 8.5–10.1)
CHLORIDE SERPL-SCNC: 109 MMOL/L — HIGH (ref 96–108)
CO2 SERPL-SCNC: 27 MMOL/L — SIGNIFICANT CHANGE UP (ref 22–31)
CREAT SERPL-MCNC: 0.68 MG/DL — SIGNIFICANT CHANGE UP (ref 0.5–1.3)
EOSINOPHIL # BLD AUTO: 0.11 K/UL — SIGNIFICANT CHANGE UP (ref 0–0.5)
EOSINOPHIL NFR BLD AUTO: 1.1 % — SIGNIFICANT CHANGE UP (ref 0–6)
GLUCOSE SERPL-MCNC: 83 MG/DL — SIGNIFICANT CHANGE UP (ref 70–99)
HCG SERPL-ACNC: 1 MIU/ML — SIGNIFICANT CHANGE UP
HCT VFR BLD CALC: 40.3 % — SIGNIFICANT CHANGE UP (ref 34.5–45)
HGB BLD-MCNC: 13.7 G/DL — SIGNIFICANT CHANGE UP (ref 11.5–15.5)
IMM GRANULOCYTES NFR BLD AUTO: 0.2 % — SIGNIFICANT CHANGE UP (ref 0–1.5)
LYMPHOCYTES # BLD AUTO: 4.47 K/UL — HIGH (ref 1–3.3)
LYMPHOCYTES # BLD AUTO: 45.6 % — HIGH (ref 13–44)
MCHC RBC-ENTMCNC: 30.1 PG — SIGNIFICANT CHANGE UP (ref 27–34)
MCHC RBC-ENTMCNC: 34 GM/DL — SIGNIFICANT CHANGE UP (ref 32–36)
MCV RBC AUTO: 88.6 FL — SIGNIFICANT CHANGE UP (ref 80–100)
MONOCYTES # BLD AUTO: 0.6 K/UL — SIGNIFICANT CHANGE UP (ref 0–0.9)
MONOCYTES NFR BLD AUTO: 6.1 % — SIGNIFICANT CHANGE UP (ref 2–14)
NEUTROPHILS # BLD AUTO: 4.56 K/UL — SIGNIFICANT CHANGE UP (ref 1.8–7.4)
NEUTROPHILS NFR BLD AUTO: 46.6 % — SIGNIFICANT CHANGE UP (ref 43–77)
PLATELET # BLD AUTO: 289 K/UL — SIGNIFICANT CHANGE UP (ref 150–400)
POTASSIUM SERPL-MCNC: 3.6 MMOL/L — SIGNIFICANT CHANGE UP (ref 3.5–5.3)
POTASSIUM SERPL-SCNC: 3.6 MMOL/L — SIGNIFICANT CHANGE UP (ref 3.5–5.3)
PROT SERPL-MCNC: 7.3 GM/DL — SIGNIFICANT CHANGE UP (ref 6–8.3)
RBC # BLD: 4.55 M/UL — SIGNIFICANT CHANGE UP (ref 3.8–5.2)
RBC # FLD: 11.9 % — SIGNIFICANT CHANGE UP (ref 10.3–14.5)
SODIUM SERPL-SCNC: 141 MMOL/L — SIGNIFICANT CHANGE UP (ref 135–145)
WBC # BLD: 9.8 K/UL — SIGNIFICANT CHANGE UP (ref 3.8–10.5)
WBC # FLD AUTO: 9.8 K/UL — SIGNIFICANT CHANGE UP (ref 3.8–10.5)

## 2021-02-25 PROCEDURE — 99283 EMERGENCY DEPT VISIT LOW MDM: CPT

## 2021-02-25 PROCEDURE — 80053 COMPREHEN METABOLIC PANEL: CPT

## 2021-02-25 PROCEDURE — 85025 COMPLETE CBC W/AUTO DIFF WBC: CPT

## 2021-02-25 PROCEDURE — 87086 URINE CULTURE/COLONY COUNT: CPT

## 2021-02-25 PROCEDURE — 81001 URINALYSIS AUTO W/SCOPE: CPT

## 2021-02-25 PROCEDURE — 84702 CHORIONIC GONADOTROPIN TEST: CPT

## 2021-02-25 PROCEDURE — 36415 COLL VENOUS BLD VENIPUNCTURE: CPT

## 2021-02-25 RX ORDER — ACETAMINOPHEN 500 MG
650 TABLET ORAL ONCE
Refills: 0 | Status: COMPLETED | OUTPATIENT
Start: 2021-02-25 | End: 2021-02-25

## 2021-02-25 RX ORDER — SODIUM CHLORIDE 9 MG/ML
1000 INJECTION INTRAMUSCULAR; INTRAVENOUS; SUBCUTANEOUS ONCE
Refills: 0 | Status: COMPLETED | OUTPATIENT
Start: 2021-02-25 | End: 2021-02-25

## 2021-02-25 RX ADMIN — SODIUM CHLORIDE 2000 MILLILITER(S): 9 INJECTION INTRAMUSCULAR; INTRAVENOUS; SUBCUTANEOUS at 22:46

## 2021-02-25 RX ADMIN — Medication 650 MILLIGRAM(S): at 22:47

## 2021-02-25 NOTE — ED PROVIDER NOTE - NSFOLLOWUPINSTRUCTIONS_ED_ALL_ED_FT
Aborto espontáneo incompleto  Incomplete Miscarriage    El aborto espontáneo es la pérdida de un bebé que no ha nacido (feto) antes de la semana 20 del embarazo. En un aborto espontáneo incompleto, partes del feto o la placenta (alumbramiento) permanecen en el cuerpo. La mayor parte de los abortos espontáneos ocurre michell los primeros 3 meses de embarazo. En algunos casos, sucede antes de que la eloy sepa que está embarazada.    El aborto espontáneo puede ser kaz experiencia que afecte emocionalmente a la persona. Si ha sufrido un aborto espontáneo, hable con bustos médico para formularle las preguntas que tenga sobre el aborto, el proceso de duelo y los planes futuros de embarazo.    ¿Cuáles son las causas?  Esta afección puede ser causada por lo siguiente:    Problemas genéticos o cromosómicos que impiden que el bebé se desarrolle con normalidad. Estos problemas son, en general, el resultado de errores fortuitos que ocurren en la etapa temprana del desarrollo y que no se transmiten de padres a hijos (no se heredan).  Infección en el lizeth del útero.  Trastornos que afectan el equilibrio hormonal del organismo.  Problemas en el lizeth del útero, luana bustos adelgazamiento y apertura antes de que el embarazo llegue a término (insuficiencia del lizeth de útero).  Problemas en el útero, luana un útero con forma anómala o fibromas en el útero, o problemas existentes desde el nacimiento (anormalidades congénitas).  Ciertas enfermedades crónicas.  Fumar, beber alcohol o usar drogas.  Lesiones (traumatismos).    Muchas veces la causa del aborto espontáneo no se conoce.    ¿Cuáles son los signos o los síntomas?  Los síntomas de esta afección incluyen los siguientes:    Sangrado o manchado vaginal, con o sin cólicos o dolor.  Dolor o cólicos en el abdomen o en la parte inferior de la espalda.  Eliminación de líquido, tejidos o coágulos sanguíneos por la vagina.    ¿Cómo se diagnostica?  Esta afección se puede diagnosticar en función de lo siguiente:    Un examen físico.  Ecografía.  Análisis de luis.  Análisis de orina.    ¿Cómo se trata?  Un aborto espontáneo incompleto se puede tratar con alguno de los siguientes métodos:    Dilatación y curetaje (D&C). Mediante abilio procedimiento, se expande el lizeth del útero y se raspan las marroquin (endometrio) para eliminar todo leticia de tejido del embarazo.  Medicamentos, por ejemplo:  Antibióticos para tratar kaz infección.  Medicamentos para expulsar los restos de tejido del útero.  Medicamentos para reducir (contraer) el tamaño del útero. Estos medicamentos se pueden administrar si tiene un sangrado abundante.    Si bustos factor sanguíneo es Rh negativo y el de bustos bebé es Rh positivo, usted necesitará kaz inyección del medicamento llamado inmunoglobulina Rhpara proteger a los bebés futuros de tener problemas con el factor sanguíneo Rh. Los términos "Rh negativo" y "Rh positivo" hacen referencia a la presencia o no en la luis de kaz proteína específica que se encuentra en la superficie de los glóbulos rojos (factor Rh).    Siga estas indicaciones en bustos casa:  Medicamentos    [place] los medicamentos de venta joan y los recetados solamente luana se lo haya indicado el médico.  Si le recetaron antibióticos, tómelos luana se lo haya indicado el médico. No deje de isidro el antibiótico aunque comience a sentirse mejor.  No tome antiinflamatorios no esteroideos (ELIZABETH), tales luana aspirina e ibuprofeno, a menos que se lo indique el médico. Estos medicamentos pueden provocarle sangrado.     Actividad    Donn reposo según lo indicado. Pregúntele al médico qué actividades son seguras para usted.  Pídale a alguien que la ayude con las responsabilidades familiares y del hogar michell abilio tiempo.    Instrucciones generales    Lleve un registro de la cantidad y la saturación de las toallas higiénicas que utiliza cada día. Anote esta información.  Anote la cantidad de tejido o coágulos sanguíneos que expulsa por la vagina. Guarde las cantidades grandes de tejidos para que el médico los examine.  No use tampones, no se donn duchas vaginales ni tenga relaciones sexuales hasta que el médico la autorice.  Para que usted y bustos eloina puedan sobrellevar el proceso de duelo, hable con bustos médico o busque apoyo psicológico que los ayude a enfrentar la pérdida del embarazo.  Cuando esté lista, visite a bustos médico para hablar sobre los pasos importantes que deberá seguir en relación con bustos kirsty y para tener un embarazo saludable en el futuro.  Concurra a todas las visitas de seguimiento luana se lo haya indicado el médico. [place] es importante.    Dónde encontrar más información  Colegio Estadoun[employer]ense de Obstetras y Ginecólogos (American College of Obstetricians and Gynecologists): www.acog.org  Departamento de Kirsty y Servicios Humanos de los Estados Unidos, Oficina de Kirsty de la [place] (U.S. Department of Health and Human Services, Office on Women’s Health): www.womenshealth.gov    Comuníquese con un médico si:  Tiene fiebre o siente escalofríos.  Tiene kaz secreción vaginal con mal olor.    Solicite ayuda de inmediato si:  Siente calambres intensos o dolor en la espalda o en el abdomen.  Elimina coágulos sanguíneos del tamaño de kaz nuez (o más grandes) o tejido por la vagina.  Tiene sangrado vaginal abundante y necesita más de kaz toalla higiénica de tamaño regular por hora.  Se siente mareada o débil.  Se desmaya.  Siente kaz tristeza que la invade o piensa en lastimarse.    Resumen  En un aborto espontáneo incompleto, partes del feto o la placenta (alumbramiento) permanecen en el cuerpo.  Existen diversas opciones de tratamiento para un aborto espontáneo incompleto; hable con bustos médico sobre la más adecuada para usted.  Siga las indicaciones del médico en cuanto a los cuidados posteriores.  Para que usted y bustos eloina puedan sobrellevar el proceso de duelo, hable con bustos médico o busque apoyo psicológico que los ayude a enfrentar la pérdida del embarazo.    NOTAS ADICIONALES E INSTRUCCIONES    Please follow up with your Primary MD in 24-48 hr.  Seek immediate medical care for any new/worsening signs or symptoms.

## 2021-02-25 NOTE — ED PROVIDER NOTE - PATIENT PORTAL LINK FT
You can access the FollowMyHealth Patient Portal offered by [facility] by registering at the following website: [email or web address]. By joining [employer]’s FollowMyHealth portal, you will also be able to view your health information using other applications (apps) compatible with our system.

## 2021-02-25 NOTE — ED PROVIDER NOTE - CLINICAL SUMMARY MEDICAL DECISION MAKING FREE TEXT BOX
Repeat Hcg, CBC, CMP, urinalysis and urine culture, fluids, Tylenol. Depending on Hcg will consider transvaginal ultrasound vs CT scan.

## 2021-02-25 NOTE — ED ADULT NURSE NOTE - OBJECTIVE STATEMENT
Pt ambulatory to ED c/o of worsening abdominal pain, back pain and vaginal bleeding. As per pt, bleeding started this AM around 0600 when she woke up. Pt saturated about 3 sanitary pads today. Pt was recently seen in  ED for abdominal pain, was told HGC serum positive, instructed to f/u in two days. As per pt, she took home pregnancy test a couple days ago but in unsure of the result. Pt denies chest pain, no SOB. No dizziness, but c/o "burred vision."

## 2021-02-25 NOTE — ED PROVIDER NOTE - CARE PROVIDER_API CALL
Malorie Orellana)  Obstetrics and Gynecology  [address] [place] [address]  Phone: [phone]  Fax: [phone]  Follow Up Time: 1-3 Days

## 2021-02-25 NOTE — ED PROVIDER NOTE - OBJECTIVE STATEMENT
26 y/o female  with a PMHx of s/p , presents ambulatory to the ED c/o abdominal pain x1 week and vaginal bleeding that began this morning. Pt was seen at ED on 2021 for repeat Hcg which had fallen to 5.0 from 7.0 on 2021. LMP 2021. Pt reports sharp and intermittent lower bilateral abdominal pain, 8/10 in severity. Pain mildly alleviated with walking. Pt reports vaginal bleeding that began this morning, notes clots and bleeding. Pt notes associated nausea and chills. Denies chest pain, SOB, diarrhea. Not on daily medications. Pt gave birth to first child at , OBGYN at that time was Dr. Malorie Orellana. Nonsmoker, no EtOH use, no drug use. No other complaints at this time. NKDA. Does not currently follow with a PCP or OBGYN. Pt is [place]-speaking,  ID: [id number].

## 2021-02-26 VITALS
TEMPERATURE: 98 F | SYSTOLIC BLOOD PRESSURE: 119 MMHG | DIASTOLIC BLOOD PRESSURE: 72 MMHG | HEART RATE: 72 BPM | RESPIRATION RATE: 16 BRPM | OXYGEN SATURATION: 98 %

## 2021-02-26 LAB
APPEARANCE UR: CLEAR — SIGNIFICANT CHANGE UP
BILIRUB UR-MCNC: NEGATIVE — SIGNIFICANT CHANGE UP
COLOR SPEC: YELLOW — SIGNIFICANT CHANGE UP
CULTURE RESULTS: SIGNIFICANT CHANGE UP
DIFF PNL FLD: ABNORMAL
GLUCOSE UR QL: NEGATIVE MG/DL — SIGNIFICANT CHANGE UP
KETONES UR-MCNC: NEGATIVE — SIGNIFICANT CHANGE UP
LEUKOCYTE ESTERASE UR-ACNC: NEGATIVE — SIGNIFICANT CHANGE UP
NITRITE UR-MCNC: NEGATIVE — SIGNIFICANT CHANGE UP
PH UR: 7 — SIGNIFICANT CHANGE UP (ref 5–8)
PROT UR-MCNC: NEGATIVE MG/DL — SIGNIFICANT CHANGE UP
SP GR SPEC: 1.01 — SIGNIFICANT CHANGE UP (ref 1.01–1.02)
SPECIMEN SOURCE: SIGNIFICANT CHANGE UP
UROBILINOGEN FLD QL: NEGATIVE MG/DL — SIGNIFICANT CHANGE UP

## 2021-06-14 ENCOUNTER — ASOB RESULT (OUTPATIENT)
Age: 26
End: 2021-06-14

## 2021-06-14 ENCOUNTER — APPOINTMENT (OUTPATIENT)
Dept: ANTEPARTUM | Facility: CLINIC | Age: 26
End: 2021-06-14
Payer: MEDICAID

## 2021-06-14 PROCEDURE — 99072 ADDL SUPL MATRL&STAF TM PHE: CPT

## 2021-06-14 PROCEDURE — 76801 OB US < 14 WKS SINGLE FETUS: CPT

## 2021-06-14 PROCEDURE — 76813 OB US NUCHAL MEAS 1 GEST: CPT | Mod: 59

## 2021-06-17 NOTE — ED ADULT NURSE NOTE - CAS DISCH ACCOMP BY
Cytoscopy with Stent Removal     Pre-Procedure Diagnosis: s/p bilateral stent placement due to neurogenic bladder, CKD    Post-Procedural Diagnosis: Same    Procedure Performed: Cystoscopy with Ureteral Stent Removal, bilateral    Description of the Procedure:     Patient was appropriately identified and brought to the cytoscopy suite. A timeout was undertaken documenting the correct patient, site, and procedure. Indwelling Jennings catheter removed by deflating the balloon.The patient was prepped in a normal sterile fashion. A flexible cytoscope was then introduced into the bladder. The stent was identified and grasped with endoscopic graspers. The entire stent was removed and passed off the field.  The cystoscope was then reinserted and the procedure was performed on the contralateral side.  Patient tolerated the procedure well. There were no intraprocedural complications.   Patient was then taught by staff to perform intermittent self-catheterization.  Patient was able to successfully perform self-catheterization with 16 [place] catheters.  Provided samples.       Assessment and Plan   Diagnoses and all orders for this visit:    1. Dysuria (Primary)  -     sulfamethoxazole-trimethoprim (Bactrim DS) 800-160 MG per tablet; Take 1 tablet by mouth 2 (Two) Times a Day.  Dispense: 6 tablet; Refill: 0    2. Neurogenic bladder  -     US Renal Bilateral; Future  -     Basic Metabolic Panel; Future    3. Stage 3 chronic kidney disease, unspecified whether stage 3a or 3b CKD (CMS/HCC)  -     US Renal Bilateral; Future  -     Basic Metabolic Panel; Future    4. Hydronephrosis, unspecified hydronephrosis type      BMP reviewed; creatinine stable at 2.1; stents removed today  Tolerated procedure well.  Instructions provided.  Patient to initiate intermittent self cath 4 times daily.  Patient notify office immediately if unable to perform intermittent self cath so that indwelling Jennings catheter may be replaced.  Plan for follow-up  in 1 month with ultrasound and BMP prior.  If renal function remains stable and successfully cathing; plan to discuss and arrange UDS at follow-up  Encourage patient to follow-up with nephrology given CKD  All questions addressed    Current Outpatient Medications:   •  sulfamethoxazole-trimethoprim (Bactrim DS) 800-160 MG per tablet, Take 1 tablet by mouth 2 (Two) Times a Day., Disp: 6 tablet, Rfl: 0      Electronically Signed by: Emily Love MD on 06/17/2021   Self

## 2021-08-05 ENCOUNTER — APPOINTMENT (OUTPATIENT)
Dept: RADIOLOGY | Facility: CLINIC | Age: 26
End: 2021-08-05
Payer: MEDICAID

## 2021-08-05 ENCOUNTER — OUTPATIENT (OUTPATIENT)
Dept: OUTPATIENT SERVICES | Facility: HOSPITAL | Age: 26
LOS: 1 days | End: 2021-08-05
Payer: MEDICAID

## 2021-08-05 DIAGNOSIS — R76.11 NONSPECIFIC REACTION TO TUBERCULIN SKIN TEST WITHOUT ACTIVE TUBERCULOSIS: ICD-10-CM

## 2021-08-05 DIAGNOSIS — Z98.891 HISTORY OF UTERINE SCAR FROM PREVIOUS SURGERY: Chronic | ICD-10-CM

## 2021-08-05 PROCEDURE — 71045 X-RAY EXAM CHEST 1 VIEW: CPT | Mod: 26

## 2021-08-05 PROCEDURE — 71045 X-RAY EXAM CHEST 1 VIEW: CPT

## 2021-08-09 ENCOUNTER — APPOINTMENT (OUTPATIENT)
Dept: ANTEPARTUM | Facility: CLINIC | Age: 26
End: 2021-08-09
Payer: MEDICAID

## 2021-08-09 ENCOUNTER — ASOB RESULT (OUTPATIENT)
Age: 26
End: 2021-08-09

## 2021-08-09 PROCEDURE — 76805 OB US >/= 14 WKS SNGL FETUS: CPT

## 2021-10-04 ENCOUNTER — ASOB RESULT (OUTPATIENT)
Age: 26
End: 2021-10-04

## 2021-10-04 ENCOUNTER — APPOINTMENT (OUTPATIENT)
Dept: ANTEPARTUM | Facility: CLINIC | Age: 26
End: 2021-10-04
Payer: MEDICAID

## 2021-10-04 PROCEDURE — 76817 TRANSVAGINAL US OBSTETRIC: CPT

## 2021-10-04 PROCEDURE — 76816 OB US FOLLOW-UP PER FETUS: CPT

## 2021-11-02 NOTE — ED STATDOCS - GENITOURINARY [+], MLM
DYSURIA Cheek Interpolation Flap Text: A decision was made to reconstruct the defect utilizing an interpolation axial flap and a staged reconstruction.  A telfa template was made of the defect.  This telfa template was then used to outline the Cheek Interpolation flap.  The donor area for the pedicle flap was then injected with anesthesia.  The flap was excised through the skin and subcutaneous tissue down to the layer of the underlying musculature.  The interpolation flap was carefully excised within this deep plane to maintain its blood supply.  The edges of the donor site were undermined.   The donor site was closed in a primary fashion.  The pedicle was then rotated into position and sutured.  Once the tube was sutured into place, adequate blood supply was confirmed with blanching and refill.  The pedicle was then wrapped with xeroform gauze and dressed appropriately with a telfa and gauze bandage to ensure continued blood supply and protect the attached pedicle.

## 2021-11-22 ENCOUNTER — APPOINTMENT (OUTPATIENT)
Dept: ANTEPARTUM | Facility: CLINIC | Age: 26
End: 2021-11-22
Payer: MEDICAID

## 2021-11-22 ENCOUNTER — ASOB RESULT (OUTPATIENT)
Age: 26
End: 2021-11-22

## 2021-11-22 PROCEDURE — 76816 OB US FOLLOW-UP PER FETUS: CPT

## 2021-12-06 ENCOUNTER — APPOINTMENT (OUTPATIENT)
Dept: ANTEPARTUM | Facility: CLINIC | Age: 26
End: 2021-12-06
Payer: MEDICAID

## 2021-12-06 ENCOUNTER — ASOB RESULT (OUTPATIENT)
Age: 26
End: 2021-12-06

## 2021-12-06 PROCEDURE — 76819 FETAL BIOPHYS PROFIL W/O NST: CPT

## 2021-12-14 ENCOUNTER — INPATIENT (INPATIENT)
Facility: HOSPITAL | Age: 26
LOS: 2 days | Discharge: HOME | DRG: 540 | End: 2021-12-17
Attending: OBSTETRICS & GYNECOLOGY | Admitting: OBSTETRICS & GYNECOLOGY
Payer: MEDICAID

## 2021-12-14 VITALS — HEIGHT: 65 IN | WEIGHT: 145.95 LBS

## 2021-12-14 DIAGNOSIS — Z3A.00 WEEKS OF GESTATION OF PREGNANCY NOT SPECIFIED: ICD-10-CM

## 2021-12-14 DIAGNOSIS — O26.899 OTHER SPECIFIED PREGNANCY RELATED CONDITIONS, UNSPECIFIED TRIMESTER: ICD-10-CM

## 2021-12-14 DIAGNOSIS — Z98.891 HISTORY OF UTERINE SCAR FROM PREVIOUS SURGERY: Chronic | ICD-10-CM

## 2021-12-14 LAB
APPEARANCE UR: ABNORMAL
BASOPHILS # BLD AUTO: 0.03 K/UL — SIGNIFICANT CHANGE UP (ref 0–0.2)
BASOPHILS NFR BLD AUTO: 0.2 % — SIGNIFICANT CHANGE UP (ref 0–2)
BILIRUB UR-MCNC: NEGATIVE — SIGNIFICANT CHANGE UP
COLOR SPEC: YELLOW — SIGNIFICANT CHANGE UP
DIFF PNL FLD: NEGATIVE — SIGNIFICANT CHANGE UP
EOSINOPHIL # BLD AUTO: 0.03 K/UL — SIGNIFICANT CHANGE UP (ref 0–0.5)
EOSINOPHIL NFR BLD AUTO: 0.2 % — SIGNIFICANT CHANGE UP (ref 0–6)
GLUCOSE UR QL: NEGATIVE MG/DL — SIGNIFICANT CHANGE UP
HCT VFR BLD CALC: 38.6 % — SIGNIFICANT CHANGE UP (ref 34.5–45)
HGB BLD-MCNC: 12.7 G/DL — SIGNIFICANT CHANGE UP (ref 11.5–15.5)
IMM GRANULOCYTES NFR BLD AUTO: 0.4 % — SIGNIFICANT CHANGE UP (ref 0–1.5)
KETONES UR-MCNC: ABNORMAL
LEUKOCYTE ESTERASE UR-ACNC: ABNORMAL
LYMPHOCYTES # BLD AUTO: 29.6 % — SIGNIFICANT CHANGE UP (ref 13–44)
LYMPHOCYTES # BLD AUTO: 3.78 K/UL — HIGH (ref 1–3.3)
MCHC RBC-ENTMCNC: 29.9 PG — SIGNIFICANT CHANGE UP (ref 27–34)
MCHC RBC-ENTMCNC: 32.9 GM/DL — SIGNIFICANT CHANGE UP (ref 32–36)
MCV RBC AUTO: 90.8 FL — SIGNIFICANT CHANGE UP (ref 80–100)
MONOCYTES # BLD AUTO: 0.64 K/UL — SIGNIFICANT CHANGE UP (ref 0–0.9)
MONOCYTES NFR BLD AUTO: 5 % — SIGNIFICANT CHANGE UP (ref 2–14)
NEUTROPHILS # BLD AUTO: 8.25 K/UL — HIGH (ref 1.8–7.4)
NEUTROPHILS NFR BLD AUTO: 64.6 % — SIGNIFICANT CHANGE UP (ref 43–77)
NITRITE UR-MCNC: NEGATIVE — SIGNIFICANT CHANGE UP
PH UR: 6.5 — SIGNIFICANT CHANGE UP (ref 5–8)
PLATELET # BLD AUTO: 319 K/UL — SIGNIFICANT CHANGE UP (ref 150–400)
PROT UR-MCNC: 15 MG/DL
RBC # BLD: 4.25 M/UL — SIGNIFICANT CHANGE UP (ref 3.8–5.2)
RBC # FLD: 12.3 % — SIGNIFICANT CHANGE UP (ref 10.3–14.5)
SP GR SPEC: 1.01 — SIGNIFICANT CHANGE UP (ref 1.01–1.02)
UROBILINOGEN FLD QL: NEGATIVE MG/DL — SIGNIFICANT CHANGE UP
WBC # BLD: 12.78 K/UL — HIGH (ref 3.8–10.5)
WBC # FLD AUTO: 12.78 K/UL — HIGH (ref 3.8–10.5)

## 2021-12-14 PROCEDURE — 86780 TREPONEMA PALLIDUM: CPT

## 2021-12-14 PROCEDURE — 94760 N-INVAS EAR/PLS OXIMETRY 1: CPT

## 2021-12-14 PROCEDURE — G0463: CPT

## 2021-12-14 PROCEDURE — 86850 RBC ANTIBODY SCREEN: CPT

## 2021-12-14 PROCEDURE — 86769 SARS-COV-2 COVID-19 ANTIBODY: CPT

## 2021-12-14 PROCEDURE — 81001 URINALYSIS AUTO W/SCOPE: CPT

## 2021-12-14 PROCEDURE — 86900 BLOOD TYPING SEROLOGIC ABO: CPT

## 2021-12-14 PROCEDURE — 59050 FETAL MONITOR W/REPORT: CPT

## 2021-12-14 PROCEDURE — 36415 COLL VENOUS BLD VENIPUNCTURE: CPT

## 2021-12-14 PROCEDURE — 85025 COMPLETE CBC W/AUTO DIFF WBC: CPT

## 2021-12-14 PROCEDURE — U0003: CPT

## 2021-12-14 PROCEDURE — 86901 BLOOD TYPING SEROLOGIC RH(D): CPT

## 2021-12-14 PROCEDURE — U0005: CPT

## 2021-12-14 RX ORDER — MAGNESIUM HYDROXIDE 400 MG/1
30 TABLET, CHEWABLE ORAL
Refills: 0 | Status: DISCONTINUED | OUTPATIENT
Start: 2021-12-15 | End: 2021-12-17

## 2021-12-14 RX ORDER — LANOLIN
1 OINTMENT (GRAM) TOPICAL EVERY 6 HOURS
Refills: 0 | Status: DISCONTINUED | OUTPATIENT
Start: 2021-12-15 | End: 2021-12-17

## 2021-12-14 RX ORDER — KETOROLAC TROMETHAMINE 30 MG/ML
30 SYRINGE (ML) INJECTION EVERY 6 HOURS
Refills: 0 | Status: DISCONTINUED | OUTPATIENT
Start: 2021-12-15 | End: 2021-12-15

## 2021-12-14 RX ORDER — IBUPROFEN 200 MG
600 TABLET ORAL EVERY 6 HOURS
Refills: 0 | Status: COMPLETED | OUTPATIENT
Start: 2021-12-15 | End: 2022-11-13

## 2021-12-14 RX ORDER — TETANUS TOXOID, REDUCED DIPHTHERIA TOXOID AND ACELLULAR PERTUSSIS VACCINE, ADSORBED 5; 2.5; 8; 8; 2.5 [IU]/.5ML; [IU]/.5ML; UG/.5ML; UG/.5ML; UG/.5ML
0.5 SUSPENSION INTRAMUSCULAR ONCE
Refills: 0 | Status: DISCONTINUED | OUTPATIENT
Start: 2021-12-15 | End: 2021-12-17

## 2021-12-14 RX ORDER — ENOXAPARIN SODIUM 100 MG/ML
40 INJECTION SUBCUTANEOUS
Refills: 0 | Status: DISCONTINUED | OUTPATIENT
Start: 2021-12-15 | End: 2021-12-17

## 2021-12-14 RX ORDER — OXYTOCIN 10 UNIT/ML
333.33 VIAL (ML) INJECTION
Qty: 20 | Refills: 0 | Status: DISCONTINUED | OUTPATIENT
Start: 2021-12-15 | End: 2021-12-17

## 2021-12-14 RX ORDER — OXYTOCIN 10 UNIT/ML
333.33 VIAL (ML) INJECTION
Qty: 20 | Refills: 0 | Status: DISCONTINUED | OUTPATIENT
Start: 2021-12-14 | End: 2021-12-17

## 2021-12-14 RX ORDER — DIPHENHYDRAMINE HCL 50 MG
25 CAPSULE ORAL EVERY 6 HOURS
Refills: 0 | Status: DISCONTINUED | OUTPATIENT
Start: 2021-12-15 | End: 2021-12-17

## 2021-12-14 RX ORDER — CEFAZOLIN SODIUM 1 G
2000 VIAL (EA) INJECTION ONCE
Refills: 0 | Status: COMPLETED | OUTPATIENT
Start: 2021-12-14 | End: 2021-12-14

## 2021-12-14 RX ORDER — FAMOTIDINE 10 MG/ML
20 INJECTION INTRAVENOUS ONCE
Refills: 0 | Status: COMPLETED | OUTPATIENT
Start: 2021-12-14 | End: 2021-12-14

## 2021-12-14 RX ORDER — SIMETHICONE 80 MG/1
80 TABLET, CHEWABLE ORAL EVERY 4 HOURS
Refills: 0 | Status: DISCONTINUED | OUTPATIENT
Start: 2021-12-15 | End: 2021-12-17

## 2021-12-14 RX ORDER — SODIUM CHLORIDE 9 MG/ML
1000 INJECTION, SOLUTION INTRAVENOUS
Refills: 0 | Status: DISCONTINUED | OUTPATIENT
Start: 2021-12-14 | End: 2021-12-15

## 2021-12-14 RX ORDER — OXYCODONE HYDROCHLORIDE 5 MG/1
5 TABLET ORAL
Refills: 0 | Status: DISCONTINUED | OUTPATIENT
Start: 2021-12-15 | End: 2021-12-17

## 2021-12-14 RX ORDER — ACETAMINOPHEN 500 MG
975 TABLET ORAL
Refills: 0 | Status: DISCONTINUED | OUTPATIENT
Start: 2021-12-15 | End: 2021-12-17

## 2021-12-14 RX ORDER — SODIUM CHLORIDE 9 MG/ML
1000 INJECTION, SOLUTION INTRAVENOUS ONCE
Refills: 0 | Status: COMPLETED | OUTPATIENT
Start: 2021-12-14 | End: 2021-12-14

## 2021-12-14 RX ORDER — SODIUM CHLORIDE 9 MG/ML
1000 INJECTION, SOLUTION INTRAVENOUS
Refills: 0 | Status: DISCONTINUED | OUTPATIENT
Start: 2021-12-15 | End: 2021-12-17

## 2021-12-14 RX ORDER — CITRIC ACID/SODIUM CITRATE 300-500 MG
30 SOLUTION, ORAL ORAL ONCE
Refills: 0 | Status: COMPLETED | OUTPATIENT
Start: 2021-12-14 | End: 2021-12-14

## 2021-12-14 RX ADMIN — SODIUM CHLORIDE 2000 MILLILITER(S): 9 INJECTION, SOLUTION INTRAVENOUS at 22:03

## 2021-12-14 RX ADMIN — SODIUM CHLORIDE 1000 MILLILITER(S): 9 INJECTION, SOLUTION INTRAVENOUS at 18:27

## 2021-12-14 RX ADMIN — Medication 100 MILLIGRAM(S): at 22:30

## 2021-12-14 RX ADMIN — SODIUM CHLORIDE 125 MILLILITER(S): 9 INJECTION, SOLUTION INTRAVENOUS at 22:04

## 2021-12-14 RX ADMIN — Medication 30 MILLILITER(S): at 22:26

## 2021-12-14 RX ADMIN — FAMOTIDINE 20 MILLIGRAM(S): 10 INJECTION INTRAVENOUS at 22:26

## 2021-12-14 NOTE — PATIENT PROFILE OB - FALL HARM RISK - UNIVERSAL INTERVENTIONS
Bed in lowest position, wheels locked, appropriate side rails in place/Call bell, personal items and telephone in reach/Instruct patient to call for assistance before getting out of bed or chair/Non-slip footwear when patient is out of bed/[place] to call system/Physically safe environment - no spills, clutter or unnecessary equipment/Purposeful Proactive Rounding/Room/bathroom lighting operational, light cord in reach

## 2021-12-15 LAB
COVID-19 SPIKE DOMAIN AB INTERP: POSITIVE
COVID-19 SPIKE DOMAIN ANTIBODY RESULT: >250 U/ML — HIGH
SARS-COV-2 IGG+IGM SERPL QL IA: >250 U/ML — HIGH
SARS-COV-2 IGG+IGM SERPL QL IA: POSITIVE
SARS-COV-2 RNA SPEC QL NAA+PROBE: SIGNIFICANT CHANGE UP
T PALLIDUM AB TITR SER: NEGATIVE — SIGNIFICANT CHANGE UP

## 2021-12-15 RX ORDER — IBUPROFEN 200 MG
600 TABLET ORAL EVERY 6 HOURS
Refills: 0 | Status: DISCONTINUED | OUTPATIENT
Start: 2021-12-15 | End: 2021-12-17

## 2021-12-15 RX ADMIN — Medication 975 MILLIGRAM(S): at 21:12

## 2021-12-15 RX ADMIN — Medication 30 MILLIGRAM(S): at 17:42

## 2021-12-15 RX ADMIN — Medication 975 MILLIGRAM(S): at 14:43

## 2021-12-15 RX ADMIN — Medication 30 MILLIGRAM(S): at 11:42

## 2021-12-15 RX ADMIN — ENOXAPARIN SODIUM 40 MILLIGRAM(S): 100 INJECTION SUBCUTANEOUS at 11:39

## 2021-12-15 RX ADMIN — Medication 1000 MILLIUNIT(S)/MIN: at 02:29

## 2021-12-15 RX ADMIN — Medication 975 MILLIGRAM(S): at 09:08

## 2021-12-15 RX ADMIN — Medication 975 MILLIGRAM(S): at 14:41

## 2021-12-15 RX ADMIN — Medication 30 MILLIGRAM(S): at 01:12

## 2021-12-15 RX ADMIN — Medication 975 MILLIGRAM(S): at 09:07

## 2021-12-15 RX ADMIN — Medication 30 MILLIGRAM(S): at 17:40

## 2021-12-15 RX ADMIN — Medication 30 MILLIGRAM(S): at 02:27

## 2021-12-15 RX ADMIN — Medication 30 MILLIGRAM(S): at 11:40

## 2021-12-15 RX ADMIN — Medication 975 MILLIGRAM(S): at 22:05

## 2021-12-15 RX ADMIN — Medication 1000 MILLIUNIT(S)/MIN: at 00:00

## 2021-12-15 RX ADMIN — SODIUM CHLORIDE 125 MILLILITER(S): 9 INJECTION, SOLUTION INTRAVENOUS at 11:00

## 2021-12-15 NOTE — PROGRESS NOTE ADULT - ASSESSMENT
Assessment:  JONATHAN CLAROS is a 26y s/p uncomplicated LTCS POD#1.  Plan for Contraception: unsure at this time  Breastfeeding: patient will not be breastfeeding    Problem List:  No pertinent past medical history    Plan:  1. Routine post-op care.   2. General diet.  3. Increase activity. Please ambulate patient TID today.   4. DVT ppx: SCDs.  5. Pain is well controlled. Continue pain management PRN.   6. Plan to discharge on POD# 3 according to the normal criteria.   7. Remove dressing at 24hr post-op.

## 2021-12-16 LAB
BASOPHILS # BLD AUTO: 0.03 K/UL — SIGNIFICANT CHANGE UP (ref 0–0.2)
BASOPHILS NFR BLD AUTO: 0.3 % — SIGNIFICANT CHANGE UP (ref 0–2)
EOSINOPHIL # BLD AUTO: 0.05 K/UL — SIGNIFICANT CHANGE UP (ref 0–0.5)
EOSINOPHIL NFR BLD AUTO: 0.4 % — SIGNIFICANT CHANGE UP (ref 0–6)
HCT VFR BLD CALC: 32.1 % — LOW (ref 34.5–45)
HGB BLD-MCNC: 10.7 G/DL — LOW (ref 11.5–15.5)
IMM GRANULOCYTES NFR BLD AUTO: 0.3 % — SIGNIFICANT CHANGE UP (ref 0–1.5)
LYMPHOCYTES # BLD AUTO: 2.8 K/UL — SIGNIFICANT CHANGE UP (ref 1–3.3)
LYMPHOCYTES # BLD AUTO: 24.2 % — SIGNIFICANT CHANGE UP (ref 13–44)
MCHC RBC-ENTMCNC: 30.5 PG — SIGNIFICANT CHANGE UP (ref 27–34)
MCHC RBC-ENTMCNC: 33.3 GM/DL — SIGNIFICANT CHANGE UP (ref 32–36)
MCV RBC AUTO: 91.5 FL — SIGNIFICANT CHANGE UP (ref 80–100)
MONOCYTES # BLD AUTO: 0.68 K/UL — SIGNIFICANT CHANGE UP (ref 0–0.9)
MONOCYTES NFR BLD AUTO: 5.9 % — SIGNIFICANT CHANGE UP (ref 2–14)
NEUTROPHILS # BLD AUTO: 7.97 K/UL — HIGH (ref 1.8–7.4)
NEUTROPHILS NFR BLD AUTO: 68.9 % — SIGNIFICANT CHANGE UP (ref 43–77)
PLATELET # BLD AUTO: 246 K/UL — SIGNIFICANT CHANGE UP (ref 150–400)
RBC # BLD: 3.51 M/UL — LOW (ref 3.8–5.2)
RBC # FLD: 12.6 % — SIGNIFICANT CHANGE UP (ref 10.3–14.5)
WBC # BLD: 11.57 K/UL — HIGH (ref 3.8–10.5)
WBC # FLD AUTO: 11.57 K/UL — HIGH (ref 3.8–10.5)

## 2021-12-16 RX ADMIN — Medication 975 MILLIGRAM(S): at 21:42

## 2021-12-16 RX ADMIN — Medication 975 MILLIGRAM(S): at 04:20

## 2021-12-16 RX ADMIN — Medication 975 MILLIGRAM(S): at 15:12

## 2021-12-16 RX ADMIN — Medication 600 MILLIGRAM(S): at 00:27

## 2021-12-16 RX ADMIN — Medication 600 MILLIGRAM(S): at 12:30

## 2021-12-16 RX ADMIN — ENOXAPARIN SODIUM 40 MILLIGRAM(S): 100 INJECTION SUBCUTANEOUS at 08:59

## 2021-12-16 RX ADMIN — Medication 600 MILLIGRAM(S): at 01:20

## 2021-12-16 RX ADMIN — Medication 600 MILLIGRAM(S): at 07:16

## 2021-12-16 RX ADMIN — Medication 975 MILLIGRAM(S): at 15:11

## 2021-12-16 RX ADMIN — Medication 600 MILLIGRAM(S): at 06:22

## 2021-12-16 RX ADMIN — Medication 600 MILLIGRAM(S): at 18:08

## 2021-12-16 RX ADMIN — Medication 975 MILLIGRAM(S): at 03:28

## 2021-12-16 RX ADMIN — Medication 975 MILLIGRAM(S): at 08:59

## 2021-12-16 RX ADMIN — Medication 975 MILLIGRAM(S): at 09:00

## 2021-12-16 RX ADMIN — Medication 600 MILLIGRAM(S): at 18:06

## 2021-12-16 RX ADMIN — Medication 600 MILLIGRAM(S): at 12:31

## 2021-12-16 NOTE — PROGRESS NOTE ADULT - ATTENDING COMMENTS
Patient seen independently of resident. patient doing well. Dressing removed. Patient encouraged  to ambulate/ Patient for discharge home tomorrow. Patient is happy with her new baby girl.

## 2021-12-16 NOTE — PROGRESS NOTE ADULT - ASSESSMENT
Assessment:  JONATHAN CLAROS is a 26y s/p uncomplicated LTCS POD# 2 doing well with no events overnight and no acute complaints  Plan for Contraception: unsure at this time  Breastfeeding: not breastfeeding, patient will continue with formula feeds      Plan:  1. Routine post-op care.   2. General diet.  3. Increase activity. Please ambulate patient TID today.   4. DVT ppx: SCDs.  5. Pain is well controlled. Continue pain management PRN.   7. Plan to discharge today on POD#3 according to the normal criteria.   8. Remove dressing  Assessment:  JONATHAN CLAROS is a 26y s/p uncomplicated LTCS POD# 2 doing well with no events overnight and no acute complaints  Plan for Contraception: unsure at this time  Breastfeeding: not breastfeeding, patient will continue with formula feeds      Plan:  1. Routine post-op care.   2. General diet.  3. Increase activity. Please ambulate patient TID today.   4. DVT ppx: SCDs.  5. Pain is well controlled. Continue pain management PRN.   7. Plan to discharge today on POD#3 according to the normal criteria.   8. Remove dressing     MD 1  Chart reviewed. Patient seen. Dressing removed. Patient encouraged to try to breastfeed while admitted.  Plan for discharge home tomorrow.

## 2021-12-17 ENCOUNTER — TRANSCRIPTION ENCOUNTER (OUTPATIENT)
Age: 26
End: 2021-12-17

## 2021-12-17 VITALS
DIASTOLIC BLOOD PRESSURE: 58 MMHG | TEMPERATURE: 98 F | SYSTOLIC BLOOD PRESSURE: 98 MMHG | HEART RATE: 58 BPM | RESPIRATION RATE: 16 BRPM | OXYGEN SATURATION: 96 %

## 2021-12-17 RX ORDER — IBUPROFEN 200 MG
1 TABLET ORAL
Qty: 0 | Refills: 0 | DISCHARGE
Start: 2021-12-17

## 2021-12-17 RX ORDER — ACETAMINOPHEN 500 MG
3 TABLET ORAL
Qty: 0 | Refills: 0 | DISCHARGE
Start: 2021-12-17

## 2021-12-17 RX ADMIN — Medication 600 MILLIGRAM(S): at 12:28

## 2021-12-17 RX ADMIN — Medication 600 MILLIGRAM(S): at 12:30

## 2021-12-17 RX ADMIN — Medication 600 MILLIGRAM(S): at 00:44

## 2021-12-17 RX ADMIN — Medication 975 MILLIGRAM(S): at 10:21

## 2021-12-17 RX ADMIN — Medication 975 MILLIGRAM(S): at 03:14

## 2021-12-17 RX ADMIN — ENOXAPARIN SODIUM 40 MILLIGRAM(S): 100 INJECTION SUBCUTANEOUS at 10:24

## 2021-12-17 RX ADMIN — Medication 600 MILLIGRAM(S): at 06:47

## 2021-12-17 RX ADMIN — Medication 975 MILLIGRAM(S): at 10:22

## 2021-12-17 NOTE — PROGRESS NOTE ADULT - SUBJECTIVE AND OBJECTIVE BOX
Post-op day #3 from a  section 2/2 breech presentation.     Subjective:  Patient is doing well without specific complaints.   Pain is controlled with oral medications.   Patient has been out of bed, ambulating,  voiding, tolerating a regular diet, passing flatus.  Lochia is mild, within the expected range.  Patient is breastfeeding.   Baby is doing well.     Objective:  Vital Signs Last 24 Hrs  T(C): 36.8 (16 Dec 2021 23:51), Max: 36.9 (16 Dec 2021 17:00)  T(F): 98.2 (16 Dec 2021 23:51), Max: 98.4 (16 Dec 2021 17:00)  HR: 57 (16 Dec 2021 23:51) (57 - 60)  BP: 109/67 (16 Dec 2021 23:51) (97/56 - 109/67)  RR: 16 (16 Dec 2021 23:51) (16 - 16)  SpO2: 99% (16 Dec 2021 23:51) (97% - 99%)    Constitutional: patient appears well in no acute distress.  C/P: S1,S2. RRR. Clear b/l breathing sounds.   Abdomen: Soft, non-tender, non-distended.    Uterine fundus firm, non-tender. Vaginal bleeding is mild and normal appearance   Incision: covered with strips, clean, dry, intact. No bleeding, no discharge.   Extremities: No calf tenderness bilaterally, negative Homans sign. No edema     LABS:                         10.7   11.57 )-----------( 246      ( 16 Dec 2021 08:20 )             32.1     12-14 @ 20:16   Antibody Screen: NEG  Type + Screen: --         Allergies  Allergies    No Known Drug Allergies  Seafood (Anaphylaxis)    Intolerances    MEDICATIONS  (STANDING):  acetaminophen     Tablet .. 975 milliGRAM(s) Oral <User Schedule>  diphtheria/tetanus/pertussis (acellular) Vaccine (ADAcel) 0.5 milliLiter(s) IntraMuscular once  enoxaparin Injectable 40 milliGRAM(s) SubCutaneous <User Schedule>  ibuprofen  Tablet. 600 milliGRAM(s) Oral every 6 hours  lactated ringers. 1000 milliLiter(s) (125 mL/Hr) IV Continuous <Continuous>  oxytocin Infusion 333.333 milliUNIT(s)/Min (1000 mL/Hr) IV Continuous <Continuous>  oxytocin Infusion 333.333 milliUNIT(s)/Min (1000 mL/Hr) IV Continuous <Continuous>    MEDICATIONS  (PRN):  diphenhydrAMINE 25 milliGRAM(s) Oral every 6 hours PRN Pruritus  lanolin Ointment 1 Application(s) Topical every 6 hours PRN Sore Nipples  magnesium hydroxide Suspension 30 milliLiter(s) Oral two times a day PRN Constipation  oxyCODONE    IR 5 milliGRAM(s) Oral every 3 hours PRN Moderate to Severe Pain (4-10)  simethicone 80 milliGRAM(s) Chew every 4 hours PRN Gas              
Name: JONATHAN CLAROS  MRN: [id number]  Date Admitted: 21  Location: [facility] [place] 233 01 ([facility] [place])  Attending: Malorie Orellana    All: No Known Drug Allergies  Seafood (Anaphylaxis)    Post Partum Note:     JONATHAN CLAROS is a 26y  s/p uncomplicated CS POD #1 due to breech positioning .    SUBJECTIVE:  No acute events overnight. Pain is well controlled with PRN pain medication. Not currently ambulating, but is voiding well with peace in place  and good PO intake. Has had no flatus or BM. Denies N/V. Patient is having minimal lochia which is decreasing.    She is not breastfeeding. Baby is tolerating formula well.     OBJECTIVE:  Physical exam:  General: AOx3, NAD.  Heart: RRR. S1S2.  Lungs: CTABL. Good airflow bilaterally.   Abdomen: +BS, Soft, appropriately tender, nondistended, no guarding or rebound tenderness, firm uterine fundus at umbilicus, the incision is clean dry and intact with abdominal bandage in place. No erythema or discharge.  Ext: No calf tenderness bilaterally, Brock's sign negative, warm extremities.    Vital Signs Last 24 Hrs  T(C): 36.6 (15 Dec 2021 05:49), Max: 36.8 (15 Dec 2021 02:00)  T(F): 97.9 (15 Dec 2021 05:49), Max: 98.2 (15 Dec 2021 02:00)  HR: 50 (15 Dec 2021 05:49) (46 - 57)  BP: 106/71 (15 Dec 2021 05:49) (104/74 - 134/75)  BP(mean): --  RR: 16 (15 Dec 2021 05:49) (14 - 17)  SpO2: 100% (15 Dec 2021 05:49) (100% - 100%)    LABS:                        12.7   12.78 )-----------( 319      ( 14 Dec 2021 20:16 )             38.6   
Name: JONATHAN CLAROS  MRN: [id number]  Date Admitted: 21  Location: [facility] [place] 233 01 ([facility] [place])  Attending: Malorie Orellana    All: No Known Drug Allergies  Seafood (Anaphylaxis)    Post Partum Note:     JONATHAN CLAROS is a 26y  s/p uncomplicated CS POD #2 due to prior C/s and breech position.    SUBJECTIVE:  No acute events overnight. Pain is well controlled with PRN pain medication. No problems with ambulating, voiding, or PO intake. Has had flatus . Denies N/V. Patient is having minimal lochia, which she endorses as spotting and states is decreasing.  She is formula feeding    OBJECTIVE:  Physical exam:  General: AOx3, NAD.  Heart: RRR. S1S2.  Lungs: CTABL. Good airflow bilaterally.   Abdomen: +BS, Soft, appropriately tender, nondistended, no guarding or rebound tenderness, firm uterine fundus at umbilicus, the incision is clean dry and intact with abdominal binder in place and abdominal dressing underneath. No erythema or discharge.  Ext: No calf tenderness bilaterally, Brock's sign negative, warm extremities.    Vital Signs Last 24 Hrs  T(C): 36.7 (15 Dec 2021 23:36), Max: 36.7 (15 Dec 2021 11:42)  T(F): 98.1 (15 Dec 2021 23:36), Max: 98.1 (15 Dec 2021 23:36)  HR: 60 (15 Dec 2021 23:36) (52 - 60)  BP: 111/64 (15 Dec 2021 23:36) (95/60 - 111/64)  BP(mean): --  RR: 16 (15 Dec 2021 23:36) (16 - 16)  SpO2: 99% (15 Dec 2021 23:36) (98% - 100%)    LABS:                        10.7   11.57 )-----------( 246      ( 16 Dec 2021 08:20 )             32.1

## 2021-12-17 NOTE — DISCHARGE NOTE OB - PLAN OF CARE
You had a  because your baby was on breech presentation and you had a  before.  You did not have any complications, your wound is in good condition and the vaginal bleeding is within the expected range.  Continue to take Tylenol and ibuprofen as indicated and as needed to control the pain.  Continue with your postpartum care and visit your Obstetrician in 2 weeks.

## 2021-12-17 NOTE — DISCHARGE NOTE OB - NS MD DC FALL RISK RISK
For information on Fall & Injury Prevention, visit: [email or web address] OR  [email or web address] OR  https://www.cdc.gov/steadi/patient.html

## 2021-12-17 NOTE — DISCHARGE NOTE OB - PATIENT PORTAL LINK FT
You can access the FollowMyHealth Patient Portal offered by [facility] by registering at the following website: [email or web address]. By joining [facility]’s FollowMyHealth portal, you will also be able to view your health information using other applications (apps) compatible with our system.

## 2021-12-17 NOTE — DISCHARGE NOTE OB - HOSPITAL COURSE
Hospital Course:  Patient underwent an uncomplicated  for breech presentation and previous .      EBL: 650  Hb: 10.7  Hct: 32.1     Patient's post operative course was unremarkable and she remained hemodynamically stable and afebrile throughout. Upon discharge on POD3, the patient is ambulating and voiding spontaneously, tolerated oral intake, pain was well controlled with oral medications and vital signs were stable.

## 2021-12-17 NOTE — PROGRESS NOTE ADULT - ASSESSMENT
Stable postop day #3 following  section 2/2 breech presentation.     Plan:   - Discharge home.  - Continue routine postoperative and postpartum care, encourage ambulation, analgesia as needed and ordered.  - Reviewed post op care of wound, analgesics, activity.  - Follow up with Ob Gyn provider in 2 weeks.   - Continue oral hydration and breastfeeding.

## 2021-12-17 NOTE — DISCHARGE NOTE OB - CARE PLAN
1 Principal Discharge DX:	S/P  section  Assessment and plan of treatment:	You had a  because your baby was on breech presentation and you had a  before.  You did not have any complications, your wound is in good condition and the vaginal bleeding is within the expected range.  Continue to take Tylenol and ibuprofen as indicated and as needed to control the pain.  Continue with your postpartum care and visit your Obstetrician in 2 weeks.

## 2021-12-17 NOTE — DISCHARGE NOTE OB - CARE PROVIDER_API CALL
Malorie Orellana)  Obstetrics and Gynecology  [address] [place] [address]  Phone: [phone]  Fax: [phone]  Follow Up Time:

## 2021-12-22 DIAGNOSIS — Z20.822 CONTACT WITH AND (SUSPECTED) EXPOSURE TO COVID-19: ICD-10-CM

## 2021-12-22 DIAGNOSIS — O34.211 MATERNAL CARE FOR LOW TRANSVERSE SCAR FROM PREVIOUS CESAREAN DELIVERY: ICD-10-CM

## 2021-12-22 DIAGNOSIS — Z28.09 IMMUNIZATION NOT CARRIED OUT BECAUSE OF OTHER CONTRAINDICATION: ICD-10-CM

## 2021-12-22 DIAGNOSIS — O32.8XX0 MATERNAL CARE FOR OTHER MALPRESENTATION OF FETUS, NOT APPLICABLE OR UNSPECIFIED: ICD-10-CM

## 2021-12-22 DIAGNOSIS — Z3A.38 38 WEEKS GESTATION OF PREGNANCY: ICD-10-CM

## 2021-12-22 DIAGNOSIS — Z91.013 ALLERGY TO SEAFOOD: ICD-10-CM

## 2021-12-22 DIAGNOSIS — N73.6 FEMALE PELVIC PERITONEAL ADHESIONS (POSTINFECTIVE): ICD-10-CM

## 2021-12-22 DIAGNOSIS — Z34.80 ENCOUNTER FOR SUPERVISION OF OTHER NORMAL PREGNANCY, UNSPECIFIED TRIMESTER: ICD-10-CM

## 2022-04-30 NOTE — ED STATDOCS - PRINCIPAL DIAGNOSIS
Problem: INFECTION - ADULT  Goal: Absence of fever/infection during neutropenic period  Description: INTERVENTIONS:  - Monitor WBC    Outcome: Progressing Jejunitis

## 2022-07-29 NOTE — ED ADULT NURSE NOTE - ALCOHOL PRE SCREEN (AUDIT - C)
Call to pt, informed her that new prescription was sent over to [facility] as requested.  She will go , no additional questions.   Statement Selected

## 2022-11-01 NOTE — ED PROVIDER NOTE - NS_EDPROVIDERDISPOUSERTYPE_ED_A_ED
Not Applicable Scribe Attestation (For Scribes USE Only)... Attending Attestation (For Attendings USE Only).../Scribe Attestation (For Scribes USE Only)...

## 2022-12-06 NOTE — ED ADULT NURSE NOTE - TEMPLATE
Called to confirm patient will bring cpap to appointment  for kraig download.    Abdominal Pain, N/V/D

## 2023-07-13 NOTE — PATIENT PROFILE OB - AS LABOR ROM TYPE
Occupational Therapy Visit    Visit Type: Daily Treatment Note  Visit: 2  Referring Provider: Neto Grant MD  Medical Diagnosis (from order): Diagnosis Information    Diagnosis  719.41 (ICD-9-CM) - M25.511 (ICD-10-CM) - Right shoulder pain, unspecified chronicity         SUBJECTIVE                                                                                                               Right shoulder: mild pain; Left 7/10 with movement; Used pillow under arm.     Pain / Symptoms  - Pain rating (out of 10): Current: 0 ; Worst: 7     OBJECTIVE                                                                                                                                        Treatment     Therapeutic Exercise  Skilled instruction in Codman's LUE; no pain    Standing counter slide: LUE only with hip flexion and weight shift forward x 7    Supine AA/PROM BUE  All motions x 6    Supine ER bilateral at about 45 degrees x 7    Manual Therapy   STM: upper arm for relaxation    Distraction RUE with flexion for increased space with less pain.         Neuromuscular Re-Education  Scapular protraction RUE: without assist x 10: good form; slightly incoordinated.     RUE IR  Isolated motion at about 60 degrees abduction x 7    Activities of Daily Living/Self Care  Discussed ways to reduce irritation on shoulder with daily tasks/ADL with activity modifications.     Skilled input: verbal instruction/cues, tactile instruction/cues and as detailed above    Writer verbally educated and received verbal consent for hand placement, positioning of patient, and techniques to be performed today from patient for clothing adjustments for techniques, hand placement and palpation for techniques and therapist position for techniques as described above and how they are pertinent to the patient's plan of care.      ASSESSMENT                                                                                                             Tolerated increase in Range of Motion this session without increase in pain.  Able to tolerate approx. 120 degrees LUE with AAROM exercises.   Pain/symptoms after session (out of 10): 0  Education:   - Results of above outlined education: Verbalizes understanding, Demonstrates understanding and Needs reinforcement       Therapy procedure time and total treatment time can be found documented on the Time Entry flowsheet   artificial rupture

## 2023-07-24 ENCOUNTER — EMERGENCY (EMERGENCY)
Facility: HOSPITAL | Age: 28
LOS: 0 days | Discharge: LEFT BEFORE DISCHARGE | End: 2023-07-24
Payer: MEDICAID

## 2023-07-24 DIAGNOSIS — Z98.891 HISTORY OF UTERINE SCAR FROM PREVIOUS SURGERY: Chronic | ICD-10-CM

## 2023-07-24 DIAGNOSIS — H92.03 OTALGIA, BILATERAL: ICD-10-CM

## 2023-07-24 DIAGNOSIS — Z53.21 PROCEDURE AND TREATMENT NOT CARRIED OUT DUE TO PATIENT LEAVING PRIOR TO BEING SEEN BY HEALTH CARE PROVIDER: ICD-10-CM

## 2023-07-24 PROCEDURE — L9991: CPT

## 2023-07-24 NOTE — ED ADULT TRIAGE NOTE - CHIEF COMPLAINT QUOTE
Pt came into the ED with c/o b/l ear pain x3 days. Pt reports she had bilateral ear pain, pt stated "it's mostly the right ear. Pt also endorses fevers, unknown TMAX. Pt denies any PMH or allergies. Interp Pt came into the ED with c/o b/l ear pain x3 days. Pt reports she had bilateral ear pain, pt stated "it's mostly the right ear. Pt also endorses fevers, unknown TMAX. Pt denies any PMH or allergies.  ID [id number].

## 2023-09-11 NOTE — ED PROVIDER NOTE - CONDITION AT DISCHARGE:
Improved Itraconazole Counseling:  I discussed with the patient the risks of itraconazole including but not limited to liver damage, nausea/vomiting, neuropathy, and severe allergy.  The patient understands that this medication is best absorbed when taken with acidic beverages such as non-diet cola or ginger ale.  The patient understands that monitoring is required including baseline LFTs and repeat LFTs at intervals.  The patient understands that they are to contact us or the primary physician if concerning signs are noted.

## 2024-03-26 ENCOUNTER — APPOINTMENT (OUTPATIENT)
Dept: NEUROLOGY | Facility: CLINIC | Age: 29
End: 2024-03-26
Payer: MEDICAID

## 2024-03-26 PROCEDURE — 95816 EEG AWAKE AND DROWSY: CPT

## 2024-03-26 PROCEDURE — 93040 RHYTHM ECG WITH REPORT: CPT

## 2025-01-20 NOTE — ED ADULT TRIAGE NOTE - LANGUAGE ASSISTANCE NEEDED
[facility]
No-Patient/Caregiver offered and refused free interpretation services.

## 2025-02-20 NOTE — PATIENT PROFILE OB - MENTAL HEALTH CONDITIONS/SYMPTOMS, PROFILE
Chief Complaint   Patient presents with    Physical     CPE      HPI: Lisa Velazquez presents for her routine physical exam.  Today's concerns are updating health maintence.  Lisa has hypertension well controlled with lisinopril 20 mg once daily in evening.  She is feeling well. She has venous stasis which causes lower leg pain and aching occasionally. She also has periodic back pain and arthritic flares but can work through this with OTC NSAIDs and rest.     She had labs done through work in October which we reviewed today. A1c elevated at 5.9% with a fasting glucose of 105. BMP drawn here shows glucose of 116.  Total cholesterol is 140, LDL 83, triglycerides 106. Her HDL is low at 37.      Last year she had echocardiogram due to concerns for palpitation.  This revealed dilated aorta up to 3.7 cm which is above normal cutoff.  She is otherwise been asymptomatic.    LMP: No LMP recorded. Patient is postmenopausal.; Previous Paps prior history of abnormal pap smear in 1998.  All subsequent paps have been normal.   Last performed in 2020.    Last eye: Dec 2024 - wears glasses for reading  Last dental: Feb 2025-  Routinely every 6 months    Health Maintenance   Topic Date Due    Pneumococcal Vaccine 50+ (1 of 1 - PCV) Never done    Influenza Vaccine (1) Never done    COVID-19 Vaccine (1 - 2024-25 season) Never done    Cervical Cancer Screening  02/04/2025    Colorectal Cancer Screen  06/01/2025    Depression Screening  02/20/2026    Breast Cancer Screening  06/19/2026    DTaP/Tdap/Td Vaccine (3 - Td or Tdap) 01/15/2028    Hepatitis C Screening  Completed    Shingles Vaccine  Completed    Hepatitis B Vaccine  Completed    Hepatitis A Vaccine  Aged Out    Meningococcal Vaccine  Aged Out    Meningococcal Serogroup B Vaccine  Aged Out    HPV Vaccine  Aged Out       Patient Active Problem List    Diagnosis Date Noted    Class 1 obesity due to excess calories with serious comorbidity and body mass index (BMI) of 31.0 to 31.9  in adult 02/09/2021     Priority: Low    Essential hypertension 01/31/2019     Priority: Low    Adult BMI 34.0-34.9 kg/sq m 01/31/2019     Priority: Low    Elevated fasting blood sugar 01/31/2019     Priority: Low       Past Medical History:   Diagnosis Date    Allergic rhinitis     Anxiety     Dermatofibroma     Essential (primary) hypertension     Family history of breast cancer     Family history of prostate cancer     Lumbar radiculopathy     Migraines     Obesity     Papanicolaou smear of vagina with atypical squamous cells of undetermined significance (ASC-US) 12/01/1998    all followups normal    Plantar fasciitis        Family History   Problem Relation Age of Onset    Heart disease Mother     Congestive Heart Failure Mother     Hypertension Mother     Diabetes Father     Pneumonia Father     Hypertension Father     Cancer, Prostate Father     Diabetes Brother     Heart Brother         A.Fib    Alcohol Abuse Brother     Heart Brother         A.fib    Multiple Sclerosis Brother     Kidney disease Brother         Stage 4-5 CKD (on dialysis) - transplant    Cancer, Skin Melanoma Daughter 18    Cancer, Breast Maternal Aunt     Cancer, Lung Paternal Uncle     Heart Maternal Uncle        SOCIAL HISTORY:   Marital status:   Sexually active: yes  Children: 2 daughters, 5 grandchildren  Employment: full time, customer service for [employer]  Diet: normal-occasionally following Weight Watchers  Exercise: walking 5 days per week, 1 mile daily - treadmill or outdoors with her dogs  Caffeine: 24 oz coffee per day  Alcohol: 1 drinks per week  Tobacco use: none  E-cigarettes: none  Illicit drugs: none     Current Outpatient Medications   Medication Sig Dispense Refill    vitamin B-12 (CYANOCOBALAMIN) 1000 MCG tablet Take 1,000 mcg by mouth daily.      Ascorbic Acid (vitamin C) 500 MG tablet Take 500 mg by mouth daily.      lisinopril (ZESTRIL) 20 MG tablet Take 1 tablet by mouth daily. 90 tablet 4    lisinopril  (ZESTRIL) 20 MG tablet Take 1 tablet by mouth daily. 30 tablet 0    Misc Natural Products (JOINT HEALTH PO)       turmeric 500 MG capsule Take 1,000 mg by mouth.      VITAMIN D PO Vitamin D 2000 units BID      CALCIUM  mg daily      Multiple Vitamins-Minerals (MULTIVITAMIN ADULT PO)        No current facility-administered medications for this visit.       ALLERGIES:   Allergen Reactions    Cyclobenzaprine Other (See Comments)     Hypotension       ROS:   Denies any chest pain, shortness of breath, or palpitations. No nausea, vomiting, diarrhea, constipation, or rectal bleeding. No bleeding or bruising problems. No rashes or lesions.  She does complain of intermittent, bilateral breast pain on the lateral aspects.  The remainder for the complete ROS is negative    PHYSICAL EXAM:  Visit Vitals  /76   Pulse 66   Temp 97.3 °F (36.3 °C) (Other (comment))   SpO2 95%     Gen: The patient appears well developed, well nourished, stated age, and in no distress  HEENT: Normal cephalic, Atraumatic.  PERRLA, EOMI, fundi are benign. Bilateral pterygium noted. Tympanic membranes are clear bilaterally.  Normal nasal and oral mucosa.  NECK: supple, no lymphadenopathy, no thyromegaly, no JVD, no carotid bruits,   LUNGS: Chest symmetric,  Lungs are clear to auscultation. There are no wheezes, rales or rhonchi noted.  HEART: S1,S2, regular rate and rhythm no murmers  ABDOMEN:  Soft, nontender, no masses, no hepatosplenomegaly, no distension, bowel sounds are normoactive.  PELVIC: External Genitalia: normal external genitalia  Vaginal Exam: vaginal vault normal with no discharge, no cystocele, rectocele.   Cervix: atrophic. No lesions noted.   Bimanual Exam: Uterus small, firm and without tenderness  Adnexa without enlargement or tenderness  Pap obtained today.    BREAST: breasts symmetric, no dominant or suspicious mass, no skin or nipple changes, no axillary adenopathy.  No abnormalities or masses appreciated in the areas  that are painful.  Self exam is taught and encouraged  EXTREMITIES: no erythema, no edema. Several varicose veins noted. No pain with palpation over area of concern today. Negative rufus's. Normal ROM and strength against resistance.   NEUROLOGICAL: CN 2-12 grossly intact,   SKIN: warm, moist, without erythema, no suspicious lesions on exposed skin. Scar from cyst excision. Seborrheic keratoses  MENTAL STATUS: A&O X 3, normal thought, mood and affect.      Review Flowsheet  More data exists         2/20/2025   PHQ 2/9 Score   Adult PHQ 2 Score 0   Adult PHQ 2 Interpretation No further screening needed   Little interest or pleasure in activity? Not at all   Feeling down, depressed or hopeless? Not at all      Details                    DEPRESSION ASSESSMENT/PLAN:  Depression screening is negative no further plan needed.    There is no height or weight on file to calculate BMI.    BMI ASSESSMENT/PLAN:  Patient is obese.    Continue with portion controlled diet and exercise plan.        Assessment/ Plan:   1. Routine general medical examination at a health care facility    2. Essential hypertension    3. Adult BMI 34.0-34.9 kg/sq m    4. Elevated fasting blood sugar    5. Class 1 obesity due to excess calories with serious comorbidity and body mass index (BMI) of 31.0 to 31.9 in adult    6. Encounter for screening mammogram for malignant neoplasm of breast    7. Cervical cancer screening    8. Dilatation of thoracic aorta (CMD)           Complete Physical.   Healthy lifestyle changes were discussed.   Labs to include BMP annually. Continue HRA screening through work. We discussed elevated A1c, monitoring, dietary changes and medications. She would like to continue to focus on her diet.  We discussed surveillance for dilated thoracic aorta which should be performed at some point to monitor for progression and an echocardiogram was entered.  She may perform this in the next year at her convenience.  tetanus up to date.  Flu vaccine offered and declined.  Mammogram due in October, and patient assisted in scheduling. Continue lisinopril 20 mg once daily. Refills may be provided x 1 year. Will have her follow up in 1 year for cpe.. All questions and concerns were addressed.   patient verbalized understanding and agrees with the plan above. All questions were answered.         none

## 2025-03-28 NOTE — ED ADULT NURSE NOTE - OBJECTIVE STATEMENT
Pt called in to find out if her insurance will pay for the lab work if she goes to a [facility] lab. I let her know she would have to call her insurance company to be 100% sure. She asked for the telephone number to lab and I provided it.   pt c/o back pain x2 weeks, no other symptoms reported. Pt requesting pregnancy test. Pt able to ambulate in er.

## 2025-04-29 NOTE — ED ADULT NURSE NOTE - NS PRO AD BILL OF RIGHTS
5-year-old female with bilateral eye itching and redness for the past week.  Patient was initially started on Ocuflox from outside urgent care, was evaluated in the ED  2 days ago, given 1 dose of DEXA and started on p.o. Claritin.  Mother states that the symptoms are persistent, visited ED.  Denies fever, discharge, change in vision, nausea vomiting diarrhea, shortness of breath.  Reports rhinorrhea.  Denies any other symptom Yes

## 2025-07-21 NOTE — ED STATDOCS - CPE ED GASTRO NORM
12/19/24  Na 138  K+ 3.9  Cl 105  CO2 28  Calcium 9.4  Glu 135  BUN 12  Creat 0.92  GFR >60   normal...